# Patient Record
Sex: FEMALE | Race: BLACK OR AFRICAN AMERICAN | NOT HISPANIC OR LATINO | Employment: UNEMPLOYED | URBAN - METROPOLITAN AREA
[De-identification: names, ages, dates, MRNs, and addresses within clinical notes are randomized per-mention and may not be internally consistent; named-entity substitution may affect disease eponyms.]

---

## 2018-07-02 ENCOUNTER — TELEPHONE (OUTPATIENT)
Dept: DERMATOLOGY | Facility: CLINIC | Age: 28
End: 2018-07-02

## 2018-07-02 NOTE — TELEPHONE ENCOUNTER
----- Message from Mona Clayton sent at 7/2/2018  8:15 AM CDT -----  Contact: pt sister   Call caller regarding pt appt that was cancel.  [phone]

## 2018-07-05 ENCOUNTER — TELEPHONE (OUTPATIENT)
Dept: DERMATOLOGY | Facility: CLINIC | Age: 28
End: 2018-07-05

## 2018-07-25 ENCOUNTER — OFFICE VISIT (OUTPATIENT)
Dept: NEUROLOGY | Facility: CLINIC | Age: 28
End: 2018-07-25
Payer: MEDICAID

## 2018-07-25 VITALS — HEIGHT: 64 IN | DIASTOLIC BLOOD PRESSURE: 74 MMHG | HEART RATE: 100 BPM | SYSTOLIC BLOOD PRESSURE: 127 MMHG

## 2018-07-25 DIAGNOSIS — F31.9 BIPOLAR AFFECTIVE DISORDER, REMISSION STATUS UNSPECIFIED: Chronic | ICD-10-CM

## 2018-07-25 DIAGNOSIS — G44.209 TENSION HEADACHE: ICD-10-CM

## 2018-07-25 DIAGNOSIS — R63.5 WEIGHT GAIN: ICD-10-CM

## 2018-07-25 DIAGNOSIS — G80.0 SPASTIC QUADRIPLEGIC CEREBRAL PALSY: Chronic | ICD-10-CM

## 2018-07-25 PROCEDURE — 99214 OFFICE O/P EST MOD 30 MIN: CPT | Mod: PBBFAC | Performed by: STUDENT IN AN ORGANIZED HEALTH CARE EDUCATION/TRAINING PROGRAM

## 2018-07-25 PROCEDURE — 99999 PR PBB SHADOW E&M-EST. PATIENT-LVL IV: CPT | Mod: PBBFAC,,, | Performed by: STUDENT IN AN ORGANIZED HEALTH CARE EDUCATION/TRAINING PROGRAM

## 2018-07-25 PROCEDURE — 99204 OFFICE O/P NEW MOD 45 MIN: CPT | Mod: S$PBB,,, | Performed by: STUDENT IN AN ORGANIZED HEALTH CARE EDUCATION/TRAINING PROGRAM

## 2018-07-25 RX ORDER — METOPROLOL SUCCINATE 25 MG/1
25 TABLET, EXTENDED RELEASE ORAL DAILY
COMMUNITY
End: 2018-10-22

## 2018-07-25 RX ORDER — NAPROXEN 500 MG/1
500 TABLET ORAL 2 TIMES DAILY
COMMUNITY
End: 2018-12-11

## 2018-07-25 RX ORDER — SERTRALINE HYDROCHLORIDE 100 MG/1
100 TABLET, FILM COATED ORAL DAILY
COMMUNITY
End: 2018-10-22

## 2018-07-25 RX ORDER — TRAMADOL HYDROCHLORIDE 50 MG/1
50 TABLET ORAL EVERY 6 HOURS PRN
COMMUNITY
End: 2018-10-22

## 2018-07-25 RX ORDER — PANTOPRAZOLE SODIUM 40 MG/1
40 TABLET, DELAYED RELEASE ORAL DAILY
COMMUNITY

## 2018-07-25 RX ORDER — BUTALBITAL, ACETAMINOPHEN AND CAFFEINE 50; 325; 40 MG/1; MG/1; MG/1
1 TABLET ORAL EVERY 4 HOURS PRN
COMMUNITY
End: 2018-12-11

## 2018-07-25 RX ORDER — LANOLIN ALCOHOL/MO/W.PET/CERES
400 CREAM (GRAM) TOPICAL 2 TIMES DAILY
Refills: 0 | COMMUNITY
Start: 2018-07-25 | End: 2018-12-11

## 2018-07-25 NOTE — ASSESSMENT & PLAN NOTE
Patient follows with Dr. Elsie Flores at [facility] [phone].    Depakote as above

## 2018-07-25 NOTE — ASSESSMENT & PLAN NOTE
Resolved.    From neurology perspective, ok to DC depakote, as there are multiple alternative medications which would be safer in the long-term, particularly in the context of a female of child-bearing age.  Furthermore, patient reports that she has been migraine free for nearly a decade, and could certainly trial off migraine medications.  However, the patient was recently diagnosed with bipolar disorder.      Regarding pregnancy:  - patient is currently on depo-provera  - patient is not currently  interested in pregnancy.  Patient, patient's mother, and patient's care worker urged to:   (a) discuss alternative medication with psychiatry regarding her bipolar d/o, if depakote is being used to help manage this   (b) seek medical advice for recommending alternative medications should she either stop using depo, or start considering pregnancy.

## 2018-07-25 NOTE — PATIENT INSTRUCTIONS
1- Start magnesium oxide 400mg twice a day  2- Discuss the possibility of stopping depakote (and finding an alternative for bipolar disorder) with psychiatry.  From a neurology perspective, there multiple alternative medications that we can discuss for headache prevention.     Please discuss changing medications if pregnancy is considered/desired.  3- Conservative headache prevention techniques:   Limit all rescue medications to no more than 2x/week (max 3x/week).   Stay well-hydrated.  Eat regularly.  Avoid stress when possible, and try your best to get enough sleep.     Limit caffeine intake, particularly in the afternoon/evenings.  4- Come back in 3 months to see me  5- Medical records release

## 2018-07-25 NOTE — ASSESSMENT & PLAN NOTE
Ok to DC depakote from neurology perspective.    Conservative measures discussed with patient, patient's mother, and patient's care giver, including:  - Limit all rescue medications to no more than 2x/week (max 3x/week).  - Stay well-hydrated.  Eat regularly.  Avoid stress when possible, and try your best to get enough sleep.    - Limit caffeine intake, particularly in the afternoon/evenings.  - Starting magnesium oxide 400mg bid    Medical records release form signed and faxed.  Patient's mother was asked to bring cerebral imaging discs to next visit.      RTC 3 mo.  If insufficient relief with above, will consider alternative headache-prevention medications.

## 2018-07-25 NOTE — PROGRESS NOTES
Name: Sandro Ojeda  MRN: [id number]   [facility]: [id number]      Date: 07/25/2018    Chief Complaint:  headache    History of Present Illness (HPI) 07/25/2018:  Ms. Ojeda is a 29 yo F with cerebral palsy, bipolar d/o, and migraines who presents to establish care for her HA. She presents in a wheelchair with her mother and a (very knowledgeable) worker from the  program where Ms. Ojeda goes during the day; she lives on her own in an apartment in [place] with 24h assistance.  Her current HA type started in April or May.  It is worst in the R neck, and travels up along her head towards the front.  No photophobia/phonophobia/vision changes, but occasional nausea.  She has been prescribed fioricet, which she has taken every other day during occasional bad weeks, but currently hasn't taken in six days.  The fioricet helps somewhat, but not substantially.  She has a h/o migraine (in her teens), with associated photophobia/phonophobia/nausea, which was treated with Depakote 375mg daily, which she is still taking.  She has not had a typical migraine in about ten years.      ROS:  Positive for HA, L eye blindness, occasional nausea, urinary retention (urology appt next week). Negative for chest pain, palpitations, vomiting, abdo pain, diarrhea.    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    Social History: The patient  reports that she has never smoked. She does not have any smokeless tobacco history on file. She reports that she does not drink alcohol or use drugs.    Family History: Their family history includes Heart disease in her maternal grandmother and maternal uncle; Migraines in her sister.    Allergies: Topamax [topiramate]      Meds:   Current Outpatient Prescriptions on File Prior to Visit   Medication Sig Dispense Refill    baclofen (LIORESAL) 10 MG tablet  "Take 1 tablet (10 mg total) by mouth 2 (two) times daily. 60 tablet 6    cetirizine (ZYRTEC) 10 MG tablet Take 10 mg by mouth once daily.      divalproex (DEPAKOTE SPRINKLE) 125 mg capsule Take 3 CapsulesQhs 90 capsule 6    quetiapine (SEROQUEL) 200 MG Tab Take 300 mg by mouth.       tamsulosin (FLOMAX) 0.4 mg Cp24 Take 0.4 mg by mouth once daily.      CARAFATE 100 mg/mL suspension   2    citalopram (CELEXA) 10 mg/5 mL suspension Take by mouth once daily. 4TSP      dicyclomine (BENTYL) 10 MG capsule Take 10 mg by mouth 4 (four) times daily before meals and nightly.      fluticasone (FLONASE) 50 mcg/actuation nasal spray 2 sprays by Each Nare route once daily.      lansoprazole (PREVACID) 30 MG capsule Take 30 mg by mouth once daily.      oxybutynin (DITROPAN) 5 MG Tab 2 (two) times daily.   1    sucralfate (CARAFATE) 1 gram tablet Take 1 g by mouth 4 (four) times daily.      tolterodine (DETROL) 2 MG Tab Take 1 mg by mouth once daily.       No current facility-administered medications on file prior to visit.        Exam:  /74   Pulse 100   Ht 5' 4" (1.626 m)     Constitutional  Well-developed, well-nourished, appears stated age.  Sitting in wheelchair.   Respiratory  Normal respiratory effort   Cardiovascular  Radial pulses 2+ and symmetric.  No LE edema bilaterally   Neurological    * Mental status      - Orientation  Oriented to person, place, time, and situation     - Memory   Adequate recent and remote     - Attention/concentration  Intact     - Language   Stutters, repeats, but fluent.  Speech is thick.  (Frenulum intact along length of tongue.)     - Fund of knowledge  Aware of current events     - Executive  Well-organized thoughts     - Other     * Cranial nerves       - CN II  Visual fields full to confrontation with R eye     - CN III, IV, VI  Asymmetry with slight exodeviation of L eye     - CN V  Sensation V1 - V3 intact     - CN VII  Face strong and symmetric bilaterally     - CN " VIII  Hearing intact bilaterally     - CN IX, X  Palate raises midline and symmetric     - CN XI  SCM 4+/5 bilaterally.  Trapezius 2/5 bilaterally     - CN XII  Unable to protrude midline   * Motor  Muscle bulk decreased.  Strength 5/5 with bilateral elbow flexion/extension and  strength.  Increased tone throughout BUE, higher on L than R.  Unable to extend L wrist to midline, or to get L fingers fully extended, even with pROM.  Increased tone of bilateral hips and knees, with mild hypotonia of bilateral ankles.  Bilateral knee flexion 4-/5 and knee extension 4/5.  Dorsiflexion 4/5 bilat.  Plantar flexion 4-/5.    * Sensory   Symmetrical to light touch bilaterally throughout.      * Coordination  Unable to perform finger to nose or heel to shin   * Gait  Non-ambulatory   * Deep tendon reflexes  3+ biceps and bilateral brachioradialis    2+ and symmetric bilateral knees     Laboratory/Radiological:  - Results:  No visits with results within 1 Month(s) from this visit.   Latest known visit with results is:   Lab Visit on 10/01/2014   Component Date Value Ref Range Status    Valproic Acid Lvl 10/01/2014 22.2* 50.0 - 100.0 ug/mL Final    Sodium 10/01/2014 140  136 - 145 mmol/L Final    Potassium 10/01/2014 3.9  3.5 - 5.1 mmol/L Final    Chloride 10/01/2014 106  95 - 110 mmol/L Final    CO2 10/01/2014 21* 23 - 29 mmol/L Final    Glucose 10/01/2014 89  70 - 110 mg/dL Final    BUN, Bld 10/01/2014 11  6 - 20 mg/dL Final    Creatinine 10/01/2014 0.7  0.5 - 1.4 mg/dL Final    Calcium 10/01/2014 9.9  8.7 - 10.5 mg/dL Final    Total Protein 10/01/2014 7.6  6.0 - 8.4 g/dL Final    Albumin 10/01/2014 3.7  3.5 - 5.2 g/dL Final    Total Bilirubin 10/01/2014 0.4  0.1 - 1.0 mg/dL Final    Alkaline Phosphatase 10/01/2014 63  55 - 135 U/L Final    AST 10/01/2014 18  10 - 40 U/L Final    ALT 10/01/2014 22  10 - 44 U/L Final    Anion Gap 10/01/2014 13  8 - 16 mmol/L Final    eGFR if African American 10/01/2014 >60.0   >60 mL/min/1.73 m^2 Final    eGFR if non African American 10/01/2014 >60.0  >60 mL/min/1.73 m^2 Final    WBC 10/01/2014 6.96  3.90 - 12.70 K/uL Final    RBC 10/01/2014 4.22  4.00 - 5.40 M/uL Final    Hemoglobin 10/01/2014 13.8  12.0 - 16.0 g/dL Final    Hematocrit 10/01/2014 40.0  37.0 - 48.5 % Final    MCV 10/01/2014 95  82 - 98 fL Final    MCH 10/01/2014 32.7* 27.0 - 31.0 pg Final    MCHC 10/01/2014 34.5  32.0 - 36.0 % Final    RDW 10/01/2014 13.1  11.5 - 14.5 % Final    Platelets 10/01/2014 193  150 - 350 K/uL Final    MPV 10/01/2014 11.8  9.2 - 12.9 fL Final    Gran # (ANC) 10/01/2014 3.7  1.8 - 7.7 K/uL Final    Lymph # 10/01/2014 2.3  1.0 - 4.8 K/uL Final    Mono # 10/01/2014 0.8  0.3 - 1.0 K/uL Final    Eos # 10/01/2014 0.1  0.0 - 0.5 K/uL Final    Baso # 10/01/2014 0.01  0.00 - 0.20 K/uL Final    Gran% 10/01/2014 53.7  38.0 - 73.0 % Final    Lymph% 10/01/2014 33.2  18.0 - 48.0 % Final    Mono% 10/01/2014 11.4  4.0 - 15.0 % Final    Eosinophil% 10/01/2014 1.3  0.0 - 8.0 % Final    Basophil% 10/01/2014 0.1  0.0 - 1.9 % Final    Differential Method 10/01/2014 Automated   Final     No imaging available for review.    Problem List Items Addressed This Visit        Neuro    Spastic quadriplegic cerebral palsy (Chronic)    Current Assessment & Plan     Per PMR         Tension headache    Overview     Followed by Dr. Li in neurology clinic         Current Assessment & Plan     Ok to DC depakote from neurology perspective.    Conservative measures discussed with patient, patient's mother, and patient's care giver, including:  - Limit all rescue medications to no more than 2x/week (max 3x/week).  - Stay well-hydrated.  Eat regularly.  Avoid stress when possible, and try your best to get enough sleep.    - Limit caffeine intake, particularly in the afternoon/evenings.  - Starting magnesium oxide 400mg bid    Medical records release form signed and faxed.  Patient's mother was asked to bring  cerebral imaging discs to next visit.      RTC 3 mo.  If insufficient relief with above, will consider alternative headache-prevention medications.         Chronic migraine    Current Assessment & Plan     Resolved.    From neurology perspective, ok to DC depakote, as there are multiple alternative medications which would be safer in the long-term, particularly in the context of a female of child-bearing age.  Furthermore, patient reports that she has been migraine free for nearly a decade, and could certainly trial off migraine medications.  However, the patient was recently diagnosed with bipolar disorder.      Regarding pregnancy:  - patient is currently on depo-provera  - patient is not currently  interested in pregnancy.  Patient, patient's mother, and patient's care worker urged to:   (a) discuss alternative medication with psychiatry regarding her bipolar d/o, if depakote is being used to help manage this   (b) seek medical advice for recommending alternative medications should she either stop using depo, or start considering pregnancy.              Psychiatric    Bipolar disorder (Chronic)    Current Assessment & Plan     Patient follows with Dr. Elsie Flores at [facility] [phone].    Depakote as above            Endocrine    Weight gain    Current Assessment & Plan     Likely component of depakote use             RTC 3 mo, or sooner, prn    Dina Li MD  [facility]  PGY-4

## 2018-07-30 ENCOUNTER — NURSE TRIAGE (OUTPATIENT)
Dept: ADMINISTRATIVE | Facility: CLINIC | Age: 28
End: 2018-07-30

## 2018-08-01 ENCOUNTER — TELEPHONE (OUTPATIENT)
Dept: NEUROLOGY | Facility: CLINIC | Age: 28
End: 2018-08-01

## 2018-08-01 NOTE — TELEPHONE ENCOUNTER
----- Message from Shereen Javed sent at 7/31/2018  9:18 AM CDT -----  Contact: Shira (Mother) [phone]  Needs Advice    Reason for call: Shira would like to speak to someone regarding the patient's severe headaches. Please contact the patient's mother to discuss further.      Communication Preference:PHONE   Additional Information:

## 2018-08-01 NOTE — TELEPHONE ENCOUNTER
----- Message from Ophelia Bernal sent at 7/30/2018  3:01 PM CDT -----  Contact: Shira (mother) @ [phone]  Calling to speak with someone in Dr. Li' office regarding medication: magnesium oxide (MAG-OX) 400 mg tablet. Please call.

## 2018-10-22 ENCOUNTER — OFFICE VISIT (OUTPATIENT)
Dept: GASTROENTEROLOGY | Facility: CLINIC | Age: 28
End: 2018-10-22
Payer: MEDICAID

## 2018-10-22 VITALS
WEIGHT: 158 LBS | BODY MASS INDEX: 27.12 KG/M2 | DIASTOLIC BLOOD PRESSURE: 80 MMHG | SYSTOLIC BLOOD PRESSURE: 122 MMHG | HEART RATE: 120 BPM

## 2018-10-22 DIAGNOSIS — K59.04 CHRONIC IDIOPATHIC CONSTIPATION: ICD-10-CM

## 2018-10-22 DIAGNOSIS — G80.0 SPASTIC QUADRIPARESIS SECONDARY TO CEREBRAL PALSY: ICD-10-CM

## 2018-10-22 DIAGNOSIS — K21.9 GASTROESOPHAGEAL REFLUX DISEASE WITHOUT ESOPHAGITIS: ICD-10-CM

## 2018-10-22 DIAGNOSIS — I69.321 DYSPHASIA S/P CVA (CEREBROVASCULAR ACCIDENT): ICD-10-CM

## 2018-10-22 DIAGNOSIS — R13.14 PHARYNGOESOPHAGEAL DYSPHAGIA: Primary | ICD-10-CM

## 2018-10-22 PROCEDURE — 99213 OFFICE O/P EST LOW 20 MIN: CPT | Mod: PBBFAC | Performed by: INTERNAL MEDICINE

## 2018-10-22 PROCEDURE — 99999 PR PBB SHADOW E&M-EST. PATIENT-LVL III: CPT | Mod: PBBFAC,,, | Performed by: INTERNAL MEDICINE

## 2018-10-22 PROCEDURE — 99204 OFFICE O/P NEW MOD 45 MIN: CPT | Mod: S$PBB,,, | Performed by: INTERNAL MEDICINE

## 2018-10-22 RX ORDER — MOMETASONE FUROATE 50 UG/1
2 SPRAY, METERED NASAL
COMMUNITY

## 2018-10-22 RX ORDER — ACETAMINOPHEN 160 MG
10 TABLET,CHEWABLE ORAL DAILY PRN
COMMUNITY
End: 2018-12-11

## 2018-10-22 RX ORDER — METOPROLOL TARTRATE 25 MG/1
25 TABLET, FILM COATED ORAL
COMMUNITY

## 2018-10-22 RX ORDER — FLUTICASONE PROPIONATE AND SALMETEROL 100; 50 UG/1; UG/1
1 POWDER RESPIRATORY (INHALATION) EVERY 4 HOURS PRN
COMMUNITY
End: 2018-12-11

## 2018-10-22 NOTE — PROGRESS NOTES
Subjective:       Patient ID: Sandro Ojeda is a 28 y.o. female.    Chief Complaint: Gastroesophageal Reflux; Establish Care; and Dysphagia    We are asked to see this patient to establish care. She was previously seen by Dr Malloy but she no longer takes the patient's insurance. The patient was followed for GERD, but now is controlled with Protonix. She also has problems with dysphagia including an acute episode leading to an ED visit ~ 1 week ago. She has a history of previous CVA and has spastic quadriplegia. They found no abnormalities. She has not had a speech therapy evaluation since she was a child.     Review of various x rays has shown the patient to have constipation. She says she has BMs regularly now.      Review of Systems   Constitutional: Negative for activity change, appetite change, chills, diaphoresis, fatigue, fever and unexpected weight change.   HENT: Negative for congestion, ear discharge, ear pain, hearing loss, nosebleeds, postnasal drip and tinnitus.    Eyes: Positive for visual disturbance. Negative for photophobia.   Respiratory: Negative for apnea, cough, choking, chest tightness, shortness of breath and wheezing.    Cardiovascular: Negative for chest pain, palpitations and leg swelling.   Gastrointestinal: Positive for constipation. Negative for abdominal distention, abdominal pain, anal bleeding, blood in stool, diarrhea, nausea, rectal pain and vomiting.   Genitourinary: Negative for difficulty urinating, dyspareunia, dysuria, flank pain, frequency, hematuria, menstrual problem, pelvic pain, urgency, vaginal bleeding and vaginal discharge.   Musculoskeletal: Positive for gait problem. Negative for arthralgias, back pain, joint swelling, myalgias and neck stiffness.        Wheelchair bound   Skin: Negative for pallor and rash.   Neurological: Positive for speech difficulty and headaches. Negative for dizziness, tremors, seizures, syncope, weakness and numbness.        Spasticity of  multiple joints   Hematological: Negative for adenopathy.   Psychiatric/Behavioral: Negative for agitation, confusion, hallucinations, sleep disturbance and suicidal ideas.       Objective:      Physical Exam   Constitutional: She is oriented to person, place, and time. She appears well-developed and well-nourished.   HENT:   Head: Normocephalic and atraumatic.   Bilateral turbinate congestion   Eyes: Conjunctivae and EOM are normal. Pupils are equal, round, and reactive to light. Right eye exhibits no discharge. Left eye exhibits no discharge. No scleral icterus.   Neck: Normal range of motion. Neck supple. No JVD present. No thyromegaly present.   Cardiovascular: Regular rhythm, normal heart sounds and intact distal pulses. Exam reveals no gallop and no friction rub.   No murmur heard.  Tachycardia   Pulmonary/Chest: Effort normal and breath sounds normal. No respiratory distress. She has no wheezes. She has no rales. She exhibits no tenderness.   Abdominal: Soft. Bowel sounds are normal. She exhibits no distension and no mass. There is no tenderness. There is no rebound and no guarding.   Musculoskeletal: Normal range of motion. She exhibits no edema.   Lymphadenopathy:     She has no cervical adenopathy.   Neurological: She is alert and oriented to person, place, and time. She has normal reflexes. She exhibits abnormal muscle tone. Coordination normal.   Joint Spasticity; left side greater than right   Skin: Skin is warm and dry. No rash noted. No erythema. No pallor.   Psychiatric: She has a normal mood and affect. Her behavior is normal. Judgment and thought content normal.   Vitals reviewed.      Assessment:    GERD   Dysphagia   Constipation   S/P CVA         Spastic quadriplegia  No diagnosis found.    Plan:   Esophagram  Modified Barium Swallow

## 2018-10-29 ENCOUNTER — HOSPITAL ENCOUNTER (OUTPATIENT)
Dept: RADIOLOGY | Facility: HOSPITAL | Age: 28
Discharge: HOME | End: 2018-10-29
Attending: INTERNAL MEDICINE
Payer: MEDICAID

## 2018-10-29 DIAGNOSIS — K21.9 GASTROESOPHAGEAL REFLUX DISEASE WITHOUT ESOPHAGITIS: ICD-10-CM

## 2018-10-29 DIAGNOSIS — R13.14 PHARYNGOESOPHAGEAL DYSPHAGIA: ICD-10-CM

## 2018-10-29 PROCEDURE — G8996 SWALLOW CURRENT STATUS: HCPCS | Mod: CI

## 2018-10-29 PROCEDURE — G8998 SWALLOW D/C STATUS: HCPCS | Mod: CI

## 2018-10-29 PROCEDURE — 25500020 PHARM REV CODE 255: Performed by: INTERNAL MEDICINE

## 2018-10-29 PROCEDURE — G8997 SWALLOW GOAL STATUS: HCPCS | Mod: CI

## 2018-10-29 PROCEDURE — 92611 MOTION FLUOROSCOPY/SWALLOW: CPT

## 2018-10-29 PROCEDURE — 74230 X-RAY XM SWLNG FUNCJ C+: CPT | Mod: TC

## 2018-10-29 PROCEDURE — A9698 NON-RAD CONTRAST MATERIALNOC: HCPCS | Performed by: INTERNAL MEDICINE

## 2018-10-29 PROCEDURE — 74220 X-RAY XM ESOPHAGUS 1CNTRST: CPT | Mod: TC

## 2018-10-29 RX ADMIN — Medication 25 ML: at 02:10

## 2018-10-29 NOTE — PROCEDURES
Modified Barium Swallow    Patient Name:  Sandro Ojeda   MRN:  [id number]      Recommendations:     Recommendations:                General Recommendations:  Follow-up not indicated  Diet recommendations: Dental soft diet with thin liquids   Aspiration Precautions: 1 bite/sip at a time, HOB to 90 degrees, Remain upright 30 minutes post meal and Small bites/sips   General Precautions: Standard,      Referral     Reason for Referral  Patient was referred for a Modified Barium Swallow Study to assess the efficiency of his/her swallow function, rule out aspiration and make recommendations regarding safe dietary consistencies, effective compensatory strategies, and safe eating environment.     Diagnosis:    Diagnoses of Pharyngoesophageal dysphagia and Gastroesophageal reflux disease without esophagitis were pertinent to this visit.    Pt c/o odynophagia.  Pt's mom reported pt taking a a long time to swallow.      History:     Past Medical History:   Diagnosis Date    Bipolar disorder 7/25/2018    GERD (gastroesophageal reflux disease)     Headache(784.0)     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    Tension headache 7/25/2018       Objective:     Current Respiratory Status:  No O2 support required    Alert: yes    Cooperative: yes    Follows Directions: yes    Visualization  · Patient was seen in the lateral view    Oral Peripheral Examination  ·  Dentition present and adequate     Consistencies Assessed  · Thin liquids, puree, and solids with barium    Oral Preparation/Oral Phase  · Pt with oral holding and a delayed onset of swallow with no premature spillage    Pharyngeal Phase   · WFL - Pt with adequate laryngeal elevation, bolus propulsion, bolus clearance, and airway protection across consistencies    Cervical Esophageal Phase  · UES appeared to accommodate all bolus types without stasis     Assessment:     Impressions  ·  Pt presents with a safe, functional swallow across consistencies.  No aspiration  or penetration was visualized.  Pt does exhibit oral holding and complained of a painful swallow.      Education  Results were discussed with patient and her mother.    Time Tracking:   SLP Treatment Date:    10/29/2018  Speech Start Time:   13:45  Speech Stop Time:      14:30  Speech Total Time (min):   45 minutes    Maddy Lepe CCC-SLP  10/29/2018

## 2018-11-06 ENCOUNTER — TELEPHONE (OUTPATIENT)
Dept: GASTROENTEROLOGY | Facility: CLINIC | Age: 28
End: 2018-11-06

## 2018-11-06 NOTE — TELEPHONE ENCOUNTER
----- Message from Yusef Stone III, MD sent at 11/5/2018  5:43 PM CST -----  Contact: self  Shows normal esophageal caliber and normal motility. 1 episode of GERD. GH  ----- Message -----  From: Joe Nelson MA  Sent: 11/5/2018   4:44 PM  To: Yusef Stone III, MD    Patient is requesting esophagram results  ----- Message -----  From: Sharon Robbins  Sent: 11/5/2018   4:40 PM  To: [facility] Staff    Pt calling in regards to test results. Please call pt back at [phone].      Thanks,   Sharon Robbins

## 2018-12-11 ENCOUNTER — OFFICE VISIT (OUTPATIENT)
Dept: NEUROLOGY | Facility: CLINIC | Age: 28
End: 2018-12-11
Payer: MEDICAID

## 2018-12-11 VITALS
BODY MASS INDEX: 23.9 KG/M2 | SYSTOLIC BLOOD PRESSURE: 99 MMHG | HEART RATE: 77 BPM | DIASTOLIC BLOOD PRESSURE: 60 MMHG | HEIGHT: 64 IN | WEIGHT: 140 LBS

## 2018-12-11 DIAGNOSIS — F31.75 BIPOLAR DISORDER, IN PARTIAL REMISSION, MOST RECENT EPISODE DEPRESSED: Chronic | ICD-10-CM

## 2018-12-11 DIAGNOSIS — G44.209 TENSION HEADACHE: ICD-10-CM

## 2018-12-11 DIAGNOSIS — R20.8 VIBRATION SENSORY LOSS: Primary | ICD-10-CM

## 2018-12-11 PROCEDURE — 99999 PR PBB SHADOW E&M-EST. PATIENT-LVL IV: CPT | Mod: PBBFAC,,, | Performed by: STUDENT IN AN ORGANIZED HEALTH CARE EDUCATION/TRAINING PROGRAM

## 2018-12-11 PROCEDURE — 99214 OFFICE O/P EST MOD 30 MIN: CPT | Mod: S$PBB,,, | Performed by: STUDENT IN AN ORGANIZED HEALTH CARE EDUCATION/TRAINING PROGRAM

## 2018-12-11 PROCEDURE — 99214 OFFICE O/P EST MOD 30 MIN: CPT | Mod: PBBFAC | Performed by: STUDENT IN AN ORGANIZED HEALTH CARE EDUCATION/TRAINING PROGRAM

## 2018-12-11 RX ORDER — BUTALBITAL, ACETAMINOPHEN AND CAFFEINE 50; 325; 40 MG/1; MG/1; MG/1
1 TABLET ORAL EVERY 4 HOURS PRN
Qty: 9 TABLET | Refills: 5 | Status: SHIPPED | OUTPATIENT
Start: 2018-12-11 | End: 2019-01-10

## 2018-12-11 RX ORDER — LANOLIN ALCOHOL/MO/W.PET/CERES
400 CREAM (GRAM) TOPICAL 2 TIMES DAILY
Refills: 0 | COMMUNITY
Start: 2018-12-11

## 2018-12-11 RX ORDER — AMITRIPTYLINE HYDROCHLORIDE 50 MG/1
TABLET, FILM COATED ORAL
Qty: 30 TABLET | Refills: 11 | Status: SHIPPED | OUTPATIENT
Start: 2018-12-11 | End: 2019-10-25 | Stop reason: SDUPTHER

## 2018-12-11 NOTE — PROGRESS NOTES
Name: Sandro Ojeda  MRN: [id number]   CSN: [id number]      Date: 12/11/2018    Chief Complaint:  Headache    Interval History 12/11/18: Mild/infrequent HAs for several months after last seen in clinic, but now more severe and frequent HA (approx 5x/week, lasting hours-day) for approx one month.  Depakote DCd perhaps three months ago.  Seroquel DCd in favor of Abilify (psychiatry).  HA described this time as pounding, bilateral posterior, and associated with nausea and photophobia, with intermittent, concomitant more mild HA.  She took magnesium oxide one time only.  She has not taken any Fioricet since last visit.  She is not taking any medications for abortive therapy at this time.    History of Present Illness (HPI) 07/25/2018:  Ms. Ojeda is a 27 yo F with cerebral palsy, bipolar d/o, and migraines who presents to establish care for her HA. She presents in a wheelchair with her mother and a (very knowledgeable) worker from the  program where Ms. Ojeda goes during the day; she lives on her own in an apartment in [place] with 24h assistance.  Her current HA type started in April or May.  It is worst in the R neck, and travels up along her head towards the front.  No photophobia/phonophobia/vision changes, but occasional nausea.  She has been prescribed fioricet, which she has taken every other day during occasional bad weeks, but currently hasn't taken in six days.  The fioricet helps somewhat, but not substantially.  She has a h/o migraine (in her teens), with associated photophobia/phonophobia/nausea, which was treated with Depakote 375mg daily, which she is still taking.  She has not had a typical migraine in about ten years.      ROS:  Positive for HA, L eye blindness, occasional nausea. Negative for chest pain, palpitations, vomiting, abdo pain, diarrhea, constipation.    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    Social History: The patient  reports that  has never smoked. She does not have any smokeless tobacco history on file. She reports that she does not drink alcohol or use drugs.    Family History: Their family history includes Heart disease in her maternal grandmother and maternal uncle; Migraines in her sister.    Allergies: Monosodium glutamate; Topamax [topiramate]; and Vancomycin      Meds:   Current Outpatient Medications on File Prior to Visit   Medication Sig Dispense Refill    baclofen (LIORESAL) 10 MG tablet Take 1 tablet (10 mg total) by mouth 2 (two) times daily. 60 tablet 6    cetirizine (ZYRTEC) 10 MG tablet Take 10 mg by mouth once daily.      metoprolol tartrate (LOPRESSOR) 25 MG tablet Take 25 mg by mouth.      mometasone (NASONEX) 50 mcg/actuation nasal spray 2 sprays by Nasal route.      pantoprazole (PROTONIX) 40 MG tablet Take 40 mg by mouth once daily.      tamsulosin (FLOMAX) 0.4 mg Cp24 Take 0.4 mg by mouth once daily.      [DISCONTINUED] butalbital-acetaminophen-caffeine -40 mg (FIORICET, ESGIC) -40 mg per tablet Take 1 tablet by mouth every 4 (four) hours as needed for Pain.      [DISCONTINUED] citalopram (CELEXA) 10 mg/5 mL suspension Take by mouth once daily. 4TSP      [DISCONTINUED] dicyclomine (BENTYL) 10 MG capsule Take 10 mg by mouth 4 (four) times daily before meals and nightly.      [DISCONTINUED] divalproex (DEPAKOTE SPRINKLE) 125 mg capsule Take 3 CapsulesQhs 90 capsule 6    [DISCONTINUED] fluticasone-salmeterol 100-50 mcg/dose (ADVAIR) 100-50 mcg/dose diskus inhaler Inhale 1 puff into the lungs every 4 (four) hours as needed.      [DISCONTINUED] lansoprazole (PREVACID) 30 MG capsule Take 30 mg by mouth once daily.      [DISCONTINUED] loratadine (CLARITIN) 5 mg/5 mL syrup Take 10 mg by mouth daily as needed.      [DISCONTINUED] magnesium oxide  "(MAG-OX) 400 mg tablet Take 1 tablet (400 mg total) by mouth 2 (two) times daily.  0    [DISCONTINUED] naproxen (NAPROSYN) 500 MG tablet Take 500 mg by mouth 2 (two) times daily.      [DISCONTINUED] oxybutynin (DITROPAN) 5 MG Tab 2 (two) times daily.   1    [DISCONTINUED] quetiapine (SEROQUEL) 200 MG Tab Take 400 mg by mouth.       [DISCONTINUED] tolterodine (DETROL) 2 MG Tab Take 1 mg by mouth once daily.       No current facility-administered medications on file prior to visit.        Exam:  BP 99/60   Pulse 77   Ht 5' 4" (1.626 m)   Wt 63.5 kg (140 lb)   BMI 24.03 kg/m²     Constitutional  Well-developed, well-nourished, appears stated age.  Sitting in wheelchair.   Respiratory  Normal respiratory effort   Cardiovascular  Radial pulses 2+ and symmetric.  No LE edema bilaterally   Neurological    * Mental status      - Orientation  Oriented to person, place, time, and situation     - Memory   Adequate recent and remote     - Attention/concentration  Intact     - Language   Stutters, repeats, but fluent.  Speech is thick.  (Frenulum intact along length of tongue.)     - Fund of knowledge  Aware of current events     - Executive  Well-organized thoughts     - Other     * Cranial nerves       - CN II  Visual fields full to confrontation with R eye     - CN III, IV, VI  R CNVI palsy     - CN V  Sensation V1 - V3 intact     - CN VII  Face strong and symmetric bilaterally     - CN VIII  Hearing intact bilaterally     - CN IX, X  Palate raises midline and symmetric     - CN XI  SCM 4+/5 bilaterally.  Trapezius 2/5 bilaterally     - CN XII  Unable to protrude midline   * Motor  Muscle bulk decreased.  Strength 5/5 with bilateral elbow flexion/extension and  strength (with encouragement).  Unable to extend L wrist to midline, or to get L fingers fully extended, even with pROM.  Increased tone of bilateral hips and knees, with mild hypotonia of bilateral ankles.  Bilateral knee flexion 4-/5 and knee extension " 4/5.  Dorsiflexion 4/5 bilat.  Plantar flexion 4-/5.    * Sensory   Symmetrical to light touch bilaterally throughout.      * Coordination  Unable to perform finger to nose or heel to shin   * Gait  Non-ambulatory   * Deep tendon reflexes  3+ biceps and bilateral brachioradialis    2+ and symmetric bilateral knees     Laboratory/Radiological:  - Results:  No visits with results within 1 Month(s) from this visit.   Latest known visit with results is:   Lab Visit on 10/01/2014   Component Date Value Ref Range Status    Valproic Acid Lvl 10/01/2014 22.2* 50.0 - 100.0 ug/mL Final    Sodium 10/01/2014 140  136 - 145 mmol/L Final    Potassium 10/01/2014 3.9  3.5 - 5.1 mmol/L Final    Chloride 10/01/2014 106  95 - 110 mmol/L Final    CO2 10/01/2014 21* 23 - 29 mmol/L Final    Glucose 10/01/2014 89  70 - 110 mg/dL Final    BUN, Bld 10/01/2014 11  6 - 20 mg/dL Final    Creatinine 10/01/2014 0.7  0.5 - 1.4 mg/dL Final    Calcium 10/01/2014 9.9  8.7 - 10.5 mg/dL Final    Total Protein 10/01/2014 7.6  6.0 - 8.4 g/dL Final    Albumin 10/01/2014 3.7  3.5 - 5.2 g/dL Final    Total Bilirubin 10/01/2014 0.4  0.1 - 1.0 mg/dL Final    Alkaline Phosphatase 10/01/2014 63  55 - 135 U/L Final    AST 10/01/2014 18  10 - 40 U/L Final    ALT 10/01/2014 22  10 - 44 U/L Final    Anion Gap 10/01/2014 13  8 - 16 mmol/L Final    eGFR if African American 10/01/2014 >60.0  >60 mL/min/1.73 m^2 Final    eGFR if non African American 10/01/2014 >60.0  >60 mL/min/1.73 m^2 Final    WBC 10/01/2014 6.96  3.90 - 12.70 K/uL Final    RBC 10/01/2014 4.22  4.00 - 5.40 M/uL Final    Hemoglobin 10/01/2014 13.8  12.0 - 16.0 g/dL Final    Hematocrit 10/01/2014 40.0  37.0 - 48.5 % Final    MCV 10/01/2014 95  82 - 98 fL Final    MCH 10/01/2014 32.7* 27.0 - 31.0 pg Final    MCHC 10/01/2014 34.5  32.0 - 36.0 % Final    RDW 10/01/2014 13.1  11.5 - 14.5 % Final    Platelets 10/01/2014 193  150 - 350 K/uL Final    MPV 10/01/2014 11.8  9.2 -  "12.9 fL Final    Gran # (ANC) 10/01/2014 3.7  1.8 - 7.7 K/uL Final    Lymph # 10/01/2014 2.3  1.0 - 4.8 K/uL Final    Mono # 10/01/2014 0.8  0.3 - 1.0 K/uL Final    Eos # 10/01/2014 0.1  0.0 - 0.5 K/uL Final    Baso # 10/01/2014 0.01  0.00 - 0.20 K/uL Final    Gran% 10/01/2014 53.7  38.0 - 73.0 % Final    Lymph% 10/01/2014 33.2  18.0 - 48.0 % Final    Mono% 10/01/2014 11.4  4.0 - 15.0 % Final    Eosinophil% 10/01/2014 1.3  0.0 - 8.0 % Final    Basophil% 10/01/2014 0.1  0.0 - 1.9 % Final    Differential Method 10/01/2014 Automated   Final     Per report, h/o prior AIS seen on cerebral imaging.    Problem List Items Addressed This Visit        Neuro    Tension headache    Overview     Followed by Dr. Li in neurology clinic         Current Assessment & Plan     Limit all rescue medications to no more than 3x/week.  Stay well-hydrated.  Eat regularly.  Avoid stress when possible, and try your best to get enough sleep.    Limit caffeine intake, particularly in the afternoon/evenings.  Magnesium oxide 400mg bid     Ok to take ibuprofen prn for more mild HA - but no more than 2xday or 3x/week         Chronic migraine    Current Assessment & Plan     Greatly increased symptoms since Depakote DCd.   Given potential S/E of Depakote with long-term use, will prescribe alternative medication for migraine prevention.   - No Topamax/Zonegran 2/2 h/o multiple kidney stones   - No propranolol given SBP 99 today (and h/o low/normal BP)   - Consider Lamictal or Elavil for migraine prevention + mood stabilization.    Will start with Elavil 25mg qhs.  If well-tolerated, ok to increase to 50mg qhs after two weeks.  50mg tablets prescribed.  If patient feels very sleepy ("hungover") the next day, recommend taking dose earlier in the evening.    Abortive therapy: given h/o AIS (?  No imaging available to review this visit), avoid triptans.     - Cautious Fioricet use for onset of severe HA: no more than 2x/day or " 3x/week.    Recommend also starting magnesium oxide 400mg bid for concomitant headaches.            Psychiatric    Bipolar disorder (Chronic)    Current Assessment & Plan     Managed by psychiatry    Elavil, Lamictal considered for HA prevention in the setting of concomitant mood disorders.           Other Visit Diagnoses     Vibration sensory loss    -  Primary    Relevant Orders    Vitamin B1    Vitamin B2    Vitamin B6    Vitamin B12    TSH    Hemoglobin A1c    RPR        RTC 3 mo, or sooner, prn in general neurology clinic.    Dina Li MD  [facility]  PGY-4

## 2018-12-11 NOTE — ASSESSMENT & PLAN NOTE
Managed by psychiatry    Elavil, Lamictal considered for HA prevention in the setting of concomitant mood disorders.

## 2018-12-11 NOTE — ASSESSMENT & PLAN NOTE
Limit all rescue medications to no more than 3x/week.  Stay well-hydrated.  Eat regularly.  Avoid stress when possible, and try your best to get enough sleep.    Limit caffeine intake, particularly in the afternoon/evenings.  Magnesium oxide 400mg bid     Ok to take ibuprofen prn for more mild HA - but no more than 2xday or 3x/week

## 2018-12-11 NOTE — PATIENT INSTRUCTIONS
1 - Labs today  2 - Start magnesium oxide 400mg twice a day for headache prevention  3 - Start Elavil tonight.  Start with 25 mg (1/2 tab).  If well-tolerated, ok to increase to 50mg (1 tab) after two weeks.   If you feel very sleepy the next day, I recommend taking the medication earlier in the evening (e.g. 7pm instead of 8pm).  4 - For severe headaches, ok to take Fioricet, but no more than 2/day or 3/week.  5 - For mild headaches, ok to take ibuprofen, so long as total dosage of rescue mediations is no more than 3-4x/week.    Further headache prevention:  Limit all rescue medications to no more than 2x/week (max 3x/week).  Stay well-hydrated.  Eat regularly.  Avoid stress when possible, and try your best to get enough sleep.    Limit caffeine intake, particularly in the afternoon/evenings.

## 2018-12-11 NOTE — ASSESSMENT & PLAN NOTE
"Greatly increased symptoms since Depakote DCd.   Given potential S/E of Depakote with long-term use, will prescribe alternative medication for migraine prevention.   - No Topamax/Zonegran 2/2 h/o multiple kidney stones   - No propranolol given SBP 99 today (and h/o low/normal BP)   - Consider Lamictal or Elavil for migraine prevention + mood stabilization.    Will start with Elavil 25mg qhs.  If well-tolerated, ok to increase to 50mg qhs after two weeks.  50mg tablets prescribed.  If patient feels very sleepy ("hungover") the next day, recommend taking dose earlier in the evening.    Abortive therapy: given h/o AIS (?  No imaging available to review this visit), avoid triptans.     - Cautious Fioricet use for onset of severe HA: no more than 2x/day or 3x/week.    Recommend also starting magnesium oxide 400mg bid for concomitant headaches.  "

## 2019-01-08 ENCOUNTER — TELEPHONE (OUTPATIENT)
Dept: NEUROLOGY | Facility: CLINIC | Age: 29
End: 2019-01-08

## 2019-01-08 NOTE — TELEPHONE ENCOUNTER
Patient called, but no answer, and voicemail full.    Given labs, would recommend OTC vitamin B complex supplementation.  Will call again and/or discuss at next visit.    Dina Li MD  [facility]  PGY-4

## 2019-01-29 ENCOUNTER — TELEPHONE (OUTPATIENT)
Dept: NEUROLOGY | Facility: CLINIC | Age: 29
End: 2019-01-29

## 2019-01-29 NOTE — TELEPHONE ENCOUNTER
----- Message from Danna Montiel sent at 1/29/2019 12:04 PM CST -----  Contact: Pt mom Shira can be reached [phone]  Pt would like to speak with the nurse concerning getting pt blood work.      Thank you!

## 2019-01-29 NOTE — TELEPHONE ENCOUNTER
Spoke to the patient mother and she states you said  if the patients blood work was ok she didn't have to come to the next appointment. She is confirming if this correct.

## 2019-02-05 ENCOUNTER — TELEPHONE (OUTPATIENT)
Dept: NEUROLOGY | Facility: CLINIC | Age: 29
End: 2019-02-05

## 2019-02-05 NOTE — TELEPHONE ENCOUNTER
----- Message from Елена Clayton sent at 2/5/2019  1:20 PM CST -----  Contact: pt's mom  Shira  [phone]  2nd request    Pt's mom called to ask Dr Li for confirmation that her daughter does not have to come back in March since the results from last blood work are ok.    Contact: Pt mom Shira can be reached [phone]

## 2019-09-30 ENCOUNTER — TELEPHONE (OUTPATIENT)
Dept: NEUROLOGY | Facility: CLINIC | Age: 29
End: 2019-09-30

## 2019-09-30 NOTE — TELEPHONE ENCOUNTER
----- Message from Chase Alexander sent at 9/30/2019  2:20 PM CDT -----  Contact: Елена ( sister ) @ [phone]  Caller requesting a return call to establish care with a new physician/resident, marcel call

## 2019-10-25 ENCOUNTER — OFFICE VISIT (OUTPATIENT)
Dept: NEUROLOGY | Facility: CLINIC | Age: 29
End: 2019-10-25
Payer: MEDICAID

## 2019-10-25 ENCOUNTER — LAB VISIT (OUTPATIENT)
Dept: LAB | Facility: HOSPITAL | Age: 29
End: 2019-10-25
Attending: PSYCHIATRY & NEUROLOGY
Payer: MEDICAID

## 2019-10-25 VITALS
BODY MASS INDEX: 24.03 KG/M2 | DIASTOLIC BLOOD PRESSURE: 72 MMHG | HEART RATE: 82 BPM | HEIGHT: 64 IN | RESPIRATION RATE: 16 BRPM | SYSTOLIC BLOOD PRESSURE: 120 MMHG

## 2019-10-25 DIAGNOSIS — I69.359 CVA, OLD, HEMIPARESIS: ICD-10-CM

## 2019-10-25 DIAGNOSIS — G44.209 TENSION HEADACHE: ICD-10-CM

## 2019-10-25 DIAGNOSIS — F31.75 BIPOLAR DISORDER, IN PARTIAL REMISSION, MOST RECENT EPISODE DEPRESSED: Chronic | ICD-10-CM

## 2019-10-25 DIAGNOSIS — M62.838 MUSCLE SPASMS OF BOTH LOWER EXTREMITIES: ICD-10-CM

## 2019-10-25 DIAGNOSIS — G80.0 SPASTIC QUADRIPLEGIC CEREBRAL PALSY: Chronic | ICD-10-CM

## 2019-10-25 DIAGNOSIS — R63.5 WEIGHT GAIN: ICD-10-CM

## 2019-10-25 DIAGNOSIS — G80.0 SPASTIC QUADRIPLEGIC CEREBRAL PALSY: Primary | Chronic | ICD-10-CM

## 2019-10-25 PROCEDURE — 99999 PR PBB SHADOW E&M-EST. PATIENT-LVL III: CPT | Mod: PBBFAC,,, | Performed by: PSYCHIATRY & NEUROLOGY

## 2019-10-25 PROCEDURE — 99214 OFFICE O/P EST MOD 30 MIN: CPT | Mod: S$PBB,,, | Performed by: PSYCHIATRY & NEUROLOGY

## 2019-10-25 PROCEDURE — 36415 COLL VENOUS BLD VENIPUNCTURE: CPT

## 2019-10-25 PROCEDURE — 99214 PR OFFICE/OUTPT VISIT, EST, LEVL IV, 30-39 MIN: ICD-10-PCS | Mod: S$PBB,,, | Performed by: PSYCHIATRY & NEUROLOGY

## 2019-10-25 PROCEDURE — 99999 PR PBB SHADOW E&M-EST. PATIENT-LVL III: ICD-10-PCS | Mod: PBBFAC,,, | Performed by: PSYCHIATRY & NEUROLOGY

## 2019-10-25 PROCEDURE — 99213 OFFICE O/P EST LOW 20 MIN: CPT | Mod: PBBFAC | Performed by: PSYCHIATRY & NEUROLOGY

## 2019-10-25 PROCEDURE — 80183 DRUG SCRN QUANT OXCARBAZEPIN: CPT

## 2019-10-25 RX ORDER — OXCARBAZEPINE 60 MG/ML
5 SUSPENSION ORAL 2 TIMES DAILY
COMMUNITY

## 2019-10-25 RX ORDER — AMITRIPTYLINE HYDROCHLORIDE 75 MG/1
75 TABLET ORAL NIGHTLY
Qty: 30 TABLET | Refills: 11 | Status: SHIPPED | OUTPATIENT
Start: 2019-10-25 | End: 2020-10-30 | Stop reason: SDUPTHER

## 2019-10-25 RX ORDER — BACLOFEN 20 MG/1
20 TABLET ORAL 3 TIMES DAILY
Qty: 90 TABLET | Refills: 11 | Status: SHIPPED | OUTPATIENT
Start: 2019-10-25 | End: 2020-10-30 | Stop reason: SDUPTHER

## 2019-10-25 RX ORDER — GABAPENTIN 250 MG/5ML
250 SOLUTION ORAL 3 TIMES DAILY
Qty: 450 ML | Refills: 11 | Status: SHIPPED | OUTPATIENT
Start: 2019-10-25 | End: 2020-10-30 | Stop reason: SDUPTHER

## 2019-10-25 RX ORDER — GABAPENTIN 250 MG/5ML
300 SOLUTION ORAL 3 TIMES DAILY
COMMUNITY
End: 2019-10-25 | Stop reason: SDUPTHER

## 2019-10-25 RX ORDER — BACLOFEN 10 MG/1
20 TABLET ORAL 3 TIMES DAILY
Qty: 90 TABLET | Refills: 11 | Status: SHIPPED | OUTPATIENT
Start: 2019-10-25 | End: 2019-10-25 | Stop reason: SDUPTHER

## 2019-10-25 RX ORDER — SERTRALINE HYDROCHLORIDE 100 MG/1
100 TABLET, FILM COATED ORAL DAILY
COMMUNITY

## 2019-10-25 NOTE — PROGRESS NOTES
Subjective:       Patient ID: Sandro Ojeda is a 29 y.o. female.    Chief Complaint: Cerebral Palsy and Spasms    HPI     The patient with Spastic Quadriplegic CP who is here to establish care.  Her main problem right right now is related to muscle spasms. She is on Baclofen 10/20 mg BID,  mg BID and Elavil 50 mg for QHS for headaches which are well-controlled. No seizures. She is on [facility] for Bipolar Disorder.       Review of Systems   Constitutional: Negative for appetite change and fatigue.   HENT: Negative for hearing loss and tinnitus.    Eyes: Negative for photophobia and visual disturbance.   Respiratory: Negative for apnea and shortness of breath.    Cardiovascular: Negative for chest pain and palpitations.   Gastrointestinal: Negative for nausea and vomiting.   Endocrine: Negative for cold intolerance and heat intolerance.   Genitourinary: Negative for difficulty urinating and urgency.   Musculoskeletal: Positive for arthralgias and gait problem. Negative for back pain, joint swelling, myalgias, neck pain and neck stiffness.   Skin: Negative for color change and rash.   Allergic/Immunologic: Negative for environmental allergies and immunocompromised state.   Neurological: Positive for speech difficulty, weakness and headaches. Negative for dizziness, tremors, seizures, syncope, facial asymmetry, light-headedness and numbness.   Hematological: Negative for adenopathy. Does not bruise/bleed easily.   Psychiatric/Behavioral: Positive for dysphoric mood. Negative for agitation, behavioral problems, confusion, decreased concentration, hallucinations, self-injury, sleep disturbance and suicidal ideas. The patient is nervous/anxious. The patient is not hyperactive.          Current Outpatient Medications:     amitriptyline (ELAVIL) 75 MG tablet, Take 1 tablet (75 mg total) by mouth every evening., Disp: 30 tablet, Rfl: 11    ARIPiprazole (ABILIFY) 400 mg sers syringe, Inject 400 mg into the muscle every  28 days., Disp: , Rfl:     baclofen (LIORESAL) 20 MG tablet, Take 1 tablet (20 mg total) by mouth 3 (three) times daily., Disp: 90 tablet, Rfl: 11    cetirizine (ZYRTEC) 10 MG tablet, Take 10 mg by mouth once daily., Disp: , Rfl:     gabapentin (NEURONTIN) 250 mg/5 mL solution, Take 5 mLs (250 mg total) by mouth 3 (three) times daily., Disp: 450 mL, Rfl: 11    metoprolol tartrate (LOPRESSOR) 25 MG tablet, Take 25 mg by mouth., Disp: , Rfl:     OXcarbazepine (TRILEPTAL) 300 mg/5 mL (60 mg/mL) Susp, Take 5 mg by mouth 2 (two) times daily., Disp: , Rfl:     pantoprazole (PROTONIX) 40 MG tablet, Take 40 mg by mouth once daily., Disp: , Rfl:     sertraline (ZOLOFT) 100 MG tablet, Take 100 mg by mouth once daily., Disp: , Rfl:     tamsulosin (FLOMAX) 0.4 mg Cp24, Take 0.4 mg by mouth once daily., Disp: , Rfl:     magnesium oxide (MAG-OX) 400 mg (241.3 mg magnesium) tablet, Take 1 tablet (400 mg total) by mouth 2 (two) times daily., Disp: , Rfl: 0    mometasone (NASONEX) 50 mcg/actuation nasal spray, 2 sprays by Nasal route., Disp: , Rfl:   Past Medical History:   Diagnosis Date    Bipolar disorder 7/25/2018    GERD (gastroesophageal reflux disease)     Headache(784.0)     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    Tension headache 7/25/2018     Past Surgical History:   Procedure Laterality Date    BACK SURGERY      EYE SURGERY      HIP SURGERY       Social History     Socioeconomic History    Marital status: Single     Spouse name: Not on file    Number of children: Not on file    Years of education: Not on file    Highest education level: Not on file   Occupational History    Not on file   Social Needs    Financial resource strain: Not on file    Food insecurity:     Worry: Not on file     Inability: Not on file    Transportation needs:     Medical: Not on file     Non-medical: Not on file   Tobacco Use    Smoking status: Never Smoker    Smokeless tobacco: Never Used   Substance and  Sexual Activity    Alcohol use: No    Drug use: No    Sexual activity: Never   Lifestyle    Physical activity:     Days per week: Not on file     Minutes per session: Not on file    Stress: Not on file   Relationships    Social connections:     Talks on phone: Not on file     Gets together: Not on file     Attends Orthodoxy service: Not on file     Active member of club or organization: Not on file     Attends meetings of clubs or organizations: Not on file     Relationship status: Not on file   Other Topics Concern    Not on file   Social History Narrative    Not on file       Objective:           VITAL SIGNS REVIEWED    GENERAL APPEARANCE:     The patient looks comfortable.    No signs of medical or psychiatric distress.    Normal breathing pattern.    No dysmorphic features    Normal eye contact.     GENERAL MEDICAL EXAM:    HEENT:  Head is atraumatic normocephalic.     Neck and Axillae: No JVD. No thyromegaly.     Cardiopulmonary: No cyanosis. No tachypnea. Normal respiratory effort.    Gastrointestinal:  No stomas or lesions. No hernias.    Skin, Hair and Nails: No pathognonomic skin rash. No neurofibromatosis. No stigmata of autoimmune disease.     Limbs: No varicose veins. No edema.    Muskoskeletal: Flexion deformities.No signs of longstanding neuropathy. No dislocations or fractures.        Neurologic Exam     Mental Status   Oriented to person, place, and time.   Registration: recalls 3 of 3 objects. Recall at 5 minutes: recalls 3 of 3 objects. Follows 3 step commands.   Attention: normal. Concentration: normal.   Speech: slurred   Level of consciousness: alert  Knowledge: good and consistent with education. Able to perform simple calculations.   Able to name object. Able to repeat. Normal comprehension.     Spastic Dysarthria      Cranial Nerves     CN II   Visual acuity: decreased  Right visual field deficit: none    CN III, IV, VI   Pupils are equal, round, and reactive to light.  Extraocular  motions are normal.   Right pupil: Size: 2 mm. Shape: regular. Reactivity: brisk. Consensual response: intact. Accommodation: intact.   Left pupil: Size: 2 mm. Shape: regular. Reactivity: brisk. Consensual response: intact. Accommodation: intact.   CN III: no CN III palsy  CN VI: right CN VI palsy  Nystagmus: none   Diplopia: right  Ophthalmoparesis: right abduction  Upgaze: normal  Downgaze: normal  Conjugate gaze: present  Vestibulo-ocular reflex: present    CN V   Facial sensation intact.   Right facial sensation deficit: none  Left facial sensation deficit: none  Right corneal reflex: normal  Left corneal reflex: normal    CN VII   Right facial weakness: none  Left facial weakness: none  Right taste: normal  Left taste: normal    CN VIII   CN VIII normal.   Hearing: intact  Right Rinne: AC > BC  Left Rinne: AC > BC  Alexander: does not lateralize     CN IX, X   CN IX normal.   CN X normal.   Palate: symmetric  Right gag reflex: abnormal  Left gag reflex: abnormal    CN XI   CN XI normal.   Right sternocleidomastoid strength: weak  Left sternocleidomastoid strength: weak  Right trapezius strength: weak  Left trapezius strength: weak    CN XII   CN XII normal.   Tongue: not atrophic  Fasciculations: absent  Tongue deviation: none    Motor Exam   Muscle bulk: decreased  Overall muscle tone: increased  Right arm tone: spastic  Left arm tone: spastic  Right arm pronator drift: absent  Left arm pronator drift: absent  Right leg tone: spastic  Left leg tone: spastic    Strength   Right neck flexion: 4/5  Left neck flexion: 4/5  Right neck extension: 4/5  Left neck extension: 4/5  Right deltoid: 4/5  Left deltoid: 4/5  Right biceps: 4/5  Left biceps: 4/5  Right triceps: 3/5  Left triceps: 3/5  Right wrist flexion: 2/5  Left wrist flexion: 5/5  Right wrist extension: 2/5  Left wrist extension: 2/5  Right interossei: 1/5  Left interossei: 1/5  Right iliopsoas: 4/5  Left iliopsoas: 4/5  Right quadriceps: 3/5  Left quadriceps:  3/5  Right hamstring: 3/5  Left hamstring: 3/5  Right glutei: 3/5  Left glutei: 3/5  Right anterior tibial: 2/5  Left anterior tibial: 2/5  Right posterior tibial: 2/5  Left posterior tibial: 2/5  Right peroneal: 1/5  Left peroneal: 1/5  Right gastroc: 1/5  Left gastroc: 1/5    Sensory Exam   Light touch normal.   Right arm light touch: normal  Left arm light touch: normal  Right leg light touch: normal  Left leg light touch: normal  Right arm vibration: decreased from fingers  Left arm vibration: decreased from fingers  Right leg vibration: decreased from toes  Left leg vibration: decreased from toes  Right arm proprioception: decreased from fingers  Left arm proprioception: decreased from fingers  Right leg proprioception: decreased from toes  Left leg proprioception: decreased from toes  Pinprick normal.   Right arm pinprick: normal  Left arm pinprick: normal  Right leg pinprick: normal  Left leg pinprick: normal  Graphesthesia: normal  Stereognosis: normal    Gait, Coordination, and Reflexes     Gait  Gait: (WC-bound)    Coordination   Finger to nose coordination: abnormal  Heel to shin coordination: abnormal    Tremor   Resting tremor: absent  Intention tremor: absent  Action tremor: absent    Reflexes   Right brachioradialis: 3+  Left brachioradialis: 3+  Right biceps: 3+  Left biceps: 3+  Right triceps: 3+  Left triceps: 3+  Right patellar: 3+  Left patellar: 3+  Right achilles: 3+  Left achilles: 3+  Right plantar: upgoing  Left plantar: upgoing  Right Varela: present  Left Varela: present  Right ankle clonus: present  Left ankle clonus: present  Right pendular knee jerk: present  Left pendular knee jerk: present      Lab Results   Component Value Date    WBC 6.96 10/01/2014    HGB 13.8 10/01/2014    HCT 40.0 10/01/2014    MCV 95 10/01/2014     10/01/2014     Sodium   Date Value Ref Range Status   10/01/2014 140 136 - 145 mmol/L Final     Potassium   Date Value Ref Range Status   10/01/2014 3.9 3.5  - 5.1 mmol/L Final     Chloride   Date Value Ref Range Status   10/01/2014 106 95 - 110 mmol/L Final     CO2   Date Value Ref Range Status   10/01/2014 21 (L) 23 - 29 mmol/L Final     Glucose   Date Value Ref Range Status   10/01/2014 89 70 - 110 mg/dL Final     BUN, Bld   Date Value Ref Range Status   10/01/2014 11 6 - 20 mg/dL Final     Creatinine   Date Value Ref Range Status   10/01/2014 0.7 0.5 - 1.4 mg/dL Final     Calcium   Date Value Ref Range Status   10/01/2014 9.9 8.7 - 10.5 mg/dL Final     Total Protein   Date Value Ref Range Status   10/01/2014 7.6 6.0 - 8.4 g/dL Final     Albumin   Date Value Ref Range Status   10/01/2014 3.7 3.5 - 5.2 g/dL Final     Total Bilirubin   Date Value Ref Range Status   10/01/2014 0.4 0.1 - 1.0 mg/dL Final     Comment:     For infants and newborns, interpretation of results should be based  on gestational age, weight and in agreement with clinical  observations.  Premature Infant recommended reference ranges:  Up to 24 hours.............<8.0 mg/dL  Up to 48 hours............<12.0 mg/dL  3-5 days..................<15.0 mg/dL  6-29 days.................<15.0 mg/dL       Alkaline Phosphatase   Date Value Ref Range Status   10/01/2014 63 55 - 135 U/L Final     AST   Date Value Ref Range Status   10/01/2014 18 10 - 40 U/L Final     ALT   Date Value Ref Range Status   10/01/2014 22 10 - 44 U/L Final     Anion Gap   Date Value Ref Range Status   10/01/2014 13 8 - 16 mmol/L Final     eGFR if    Date Value Ref Range Status   10/01/2014 >60.0 >60 mL/min/1.73 m^2 Final     eGFR if non    Date Value Ref Range Status   10/01/2014 >60.0 >60 mL/min/1.73 m^2 Final     Comment:     Calculation used to obtain the estimated glomerular filtration  rate (eGFR) is the CKD-EPI equation. Since race is unknown   in our information system, the eGFR values for   -American and Non--American patients are given   for each creatinine result.       Lab Results    Component Value Date    [id number] 434 12/11/2018     Lab Results   Component Value Date    TSH 0.995 12/11/2018 11-    EEG NL      02- THROUGH 02-     AEEG 85 Hours (INTERPRETED 02-)    PGE, SSW, Jonathan       Reviewed the neuroimaging independently       Assessment:       1. Spastic quadriplegic cerebral palsy    2. Bipolar disorder, in partial remission, most recent episode depressed    3. Chronic migraine    4. Weight gain    5. CVA, old, hemiparesis    6. Tension headache    7. Muscle spasms of both lower extremities        Plan:             SPASIC QUADRIPARETIC CP  WITH MUSCLE SPASMS AND TENSION HEADACHET    Change Baclofen to 20 mg TID    Change GBP to 250 mg TID    Change Elavil to 75 mg QHS    OXC-MHD Level    HA1C                   MEDICAL/SURGICAL COMORBIDITIES     All relevant medical comorbidities noted and managed by primary care physician and medical care team.        RTC  Annually                 Robert Liu MD, FAAN    Attending Neurologist/Epileptologist         Diplomate, American Board-Psychiatry and Neurology (Neurology)    Diplomate, American Board-Clinical Neurophysiology (Epilpesy-Neuromuscular)     Fellow, American Academy of Neurology

## 2019-10-25 NOTE — LETTER
October 25, 2019      Dina Li MD  [address] [facility] [address]           [facility]  [address] [place] [address]  Phone: [phone]  Fax: [phone]          Patient: Sandro Ojeda   MR Number: [id number]   YOB: 1990   Date of Visit: 10/25/2019       Dear Dr. Dina Li:    Thank you for referring Sandro Ojeda to me for evaluation. Attached you will find relevant portions of my assessment and plan of care.    If you have questions, please do not hesitate to call me. I look forward to following Sandro Ojeda along with you.    Sincerely,    Robert Liu MD    Enclosure  CC:  No Recipients    If you would like to receive this communication electronically, please contact [email or web address] or [phone] to request more information on [employer] Link access.    For providers and/or their staff who would like to refer a patient to [facility], please contact us through our one-stop-shop provider referral line, [facility], at [phone].    If you feel you have received this communication in error or would no longer like to receive these types of communications, please e-mail [email or web address]

## 2019-10-25 NOTE — PATIENT INSTRUCTIONS
Muscle Spasm  A muscle spasm (also called a cramp) is an involuntary muscle contraction. The muscle tightens quickly and strongly. A hard lump may form in the muscle. Muscle spasms are very painful. Read on to learn more about muscle spasms and how to treat and prevent them.    What causes muscles to spasm?  Often, the cause of a muscle spasm is not known. Muscle spasm is due to irritation of muscle fibers. Some things can make a muscle spasm more likely. These include:  · Injury  · Heavy exercise  · Overtired muscles  · A muscle held in one position for a long time  · Dehydration  · Low levels of certain minerals in the body  · Taking certain medications, such as diuretics or water pills  · Certain medical conditions, such as kidney failure or diabetes  · Being pregnant  Stopping a muscle spasm  Muscle spasms often come and go quickly. When a muscle goes into spasm, very gently stretch and massage the muscle. This may help calm the muscle fibers. Then rest the muscle.  Preventing muscle spasms  Although there is little or no evidence that staying hydrated, taking certain vitamins or minerals or stretching works to prevent cramps, these measures may help and have other benefits. Talk to your health care provider about steps to take to avoid muscle spasms. These may include:  · Drinking enough fluids to avoid dehydration, especially when you exercise.  · Taking vitamin or mineral supplements.  · Getting regular exercise.  · Stretching regularly, especially before exercise.  · Limit caffeine and smoking.  · Taking a prescription muscle relaxant.  When to call your doctor  Call your doctor if you have any of the following:  · Severe cramping  · Cramping that lasts a long time, does not go away with stretching, or keeps coming back  · Pain, tingling, or weakness in the arms or legs  · Pain that wakes you up at night   Date Last Reviewed: 9/1/2015  © 5036-2487 [employer]. [address] [place] [address], [facility]  [place] [address]. All rights reserved. This information is not intended as a substitute for professional medical care. Always follow your healthcare professional's instructions.

## 2019-10-29 LAB — OXCARBAZEPINE METABOLITE: 25 MCG/ML (ref 3–35)

## 2020-10-30 DIAGNOSIS — R63.5 WEIGHT GAIN: ICD-10-CM

## 2020-10-30 DIAGNOSIS — G80.0 SPASTIC QUADRIPLEGIC CEREBRAL PALSY: Chronic | ICD-10-CM

## 2020-10-30 DIAGNOSIS — F31.75 BIPOLAR DISORDER, IN PARTIAL REMISSION, MOST RECENT EPISODE DEPRESSED: Chronic | ICD-10-CM

## 2020-10-30 DIAGNOSIS — G44.209 TENSION HEADACHE: ICD-10-CM

## 2020-10-30 DIAGNOSIS — I69.359 CVA, OLD, HEMIPARESIS: ICD-10-CM

## 2020-10-30 DIAGNOSIS — M62.838 MUSCLE SPASMS OF BOTH LOWER EXTREMITIES: ICD-10-CM

## 2020-10-30 RX ORDER — AMITRIPTYLINE HYDROCHLORIDE 75 MG/1
75 TABLET ORAL NIGHTLY
Qty: 30 TABLET | Refills: 11 | Status: SHIPPED | OUTPATIENT
Start: 2020-10-30 | End: 2021-11-26 | Stop reason: SDUPTHER

## 2020-10-30 RX ORDER — BACLOFEN 20 MG/1
20 TABLET ORAL 3 TIMES DAILY
Qty: 90 TABLET | Refills: 11 | Status: SHIPPED | OUTPATIENT
Start: 2020-10-30 | End: 2021-12-02 | Stop reason: SDUPTHER

## 2020-10-30 RX ORDER — GABAPENTIN 250 MG/5ML
250 SOLUTION ORAL 3 TIMES DAILY
Qty: 450 ML | Refills: 11 | Status: SHIPPED | OUTPATIENT
Start: 2020-10-30 | End: 2021-11-26 | Stop reason: SDUPTHER

## 2020-10-30 NOTE — TELEPHONE ENCOUNTER
----- Message from Lalo Tabor sent at 10/30/2020 10:48 AM CDT -----  ..Type:  RX Refill Request    Who Called: Jhoan ( jayla dawson )   Refill or New Rx: refill   RX Name and Strength: baclofen amitriptyline gabapentin  How is the patient currently taking it? (ex. 1XDay): daily   Is this a 30 day or 90 day RX: 30  Preferred Pharmacy with phone number:.  [facility] - [address] [address]  Phone: [phone] Fax: [phone]     Local or Mail Order local   Ordering Provider:  Would the patient rather a call back or a response via [facility]?  Call back   Best Call Back Number: [phone]  Additional Information:

## 2021-11-05 ENCOUNTER — TELEPHONE (OUTPATIENT)
Dept: NEUROLOGY | Facility: CLINIC | Age: 31
End: 2021-11-05
Payer: MEDICAID

## 2021-11-12 ENCOUNTER — LAB VISIT (OUTPATIENT)
Dept: LAB | Facility: HOSPITAL | Age: 31
End: 2021-11-12
Attending: FAMILY MEDICINE
Payer: MEDICAID

## 2021-11-12 ENCOUNTER — OFFICE VISIT (OUTPATIENT)
Dept: NEUROLOGY | Facility: CLINIC | Age: 31
End: 2021-11-12
Payer: MEDICAID

## 2021-11-12 VITALS
HEIGHT: 64 IN | DIASTOLIC BLOOD PRESSURE: 72 MMHG | SYSTOLIC BLOOD PRESSURE: 114 MMHG | RESPIRATION RATE: 16 BRPM | HEART RATE: 65 BPM | BODY MASS INDEX: 24.03 KG/M2 | OXYGEN SATURATION: 77 %

## 2021-11-12 DIAGNOSIS — G80.0 SPASTIC QUADRIPLEGIC CEREBRAL PALSY: ICD-10-CM

## 2021-11-12 DIAGNOSIS — R56.9 SEIZURE-LIKE ACTIVITY: ICD-10-CM

## 2021-11-12 DIAGNOSIS — M62.838 MUSCLE SPASMS OF BOTH LOWER EXTREMITIES: ICD-10-CM

## 2021-11-12 DIAGNOSIS — I69.359 CVA, OLD, HEMIPARESIS: ICD-10-CM

## 2021-11-12 DIAGNOSIS — F31.75 BIPOLAR DISORDER, IN PARTIAL REMISSION, MOST RECENT EPISODE DEPRESSED: ICD-10-CM

## 2021-11-12 DIAGNOSIS — G44.209 TENSION HEADACHE: ICD-10-CM

## 2021-11-12 DIAGNOSIS — R29.90 MULTIPLE NEUROLOGICAL SYMPTOMS: Primary | ICD-10-CM

## 2021-11-12 LAB
ALBUMIN SERPL BCP-MCNC: 3.8 G/DL (ref 3.5–5.2)
ALP SERPL-CCNC: 63 U/L (ref 55–135)
ALT SERPL W/O P-5'-P-CCNC: 32 U/L (ref 10–44)
ANION GAP SERPL CALC-SCNC: 11 MMOL/L (ref 8–16)
AST SERPL-CCNC: 23 U/L (ref 10–40)
BASOPHILS # BLD AUTO: 0.02 K/UL (ref 0–0.2)
BASOPHILS NFR BLD: 0.5 % (ref 0–1.9)
BILIRUB SERPL-MCNC: 0.4 MG/DL (ref 0.1–1)
BUN SERPL-MCNC: 7 MG/DL (ref 6–20)
CALCIUM SERPL-MCNC: 10.1 MG/DL (ref 8.7–10.5)
CHLORIDE SERPL-SCNC: 102 MMOL/L (ref 95–110)
CO2 SERPL-SCNC: 25 MMOL/L (ref 23–29)
CREAT SERPL-MCNC: 0.5 MG/DL (ref 0.5–1.4)
DIFFERENTIAL METHOD: ABNORMAL
EOSINOPHIL # BLD AUTO: 0 K/UL (ref 0–0.5)
EOSINOPHIL NFR BLD: 0.3 % (ref 0–8)
ERYTHROCYTE [DISTWIDTH] IN BLOOD BY AUTOMATED COUNT: 12.4 % (ref 11.5–14.5)
EST. GFR  (AFRICAN AMERICAN): >60 ML/MIN/1.73 M^2
EST. GFR  (NON AFRICAN AMERICAN): >60 ML/MIN/1.73 M^2
ESTIMATED AVG GLUCOSE: 88 MG/DL (ref 68–131)
GLUCOSE SERPL-MCNC: 76 MG/DL (ref 70–110)
HBA1C MFR BLD: 4.7 % (ref 4–5.6)
HCT VFR BLD AUTO: 44.7 % (ref 37–48.5)
HGB BLD-MCNC: 14.4 G/DL (ref 12–16)
IMM GRANULOCYTES # BLD AUTO: 0.01 K/UL (ref 0–0.04)
IMM GRANULOCYTES NFR BLD AUTO: 0.3 % (ref 0–0.5)
LYMPHOCYTES # BLD AUTO: 1.2 K/UL (ref 1–4.8)
LYMPHOCYTES NFR BLD: 31.9 % (ref 18–48)
MCH RBC QN AUTO: 33.3 PG (ref 27–31)
MCHC RBC AUTO-ENTMCNC: 32.2 G/DL (ref 32–36)
MCV RBC AUTO: 103 FL (ref 82–98)
MONOCYTES # BLD AUTO: 0.3 K/UL (ref 0.3–1)
MONOCYTES NFR BLD: 8.2 % (ref 4–15)
NEUTROPHILS # BLD AUTO: 2.3 K/UL (ref 1.8–7.7)
NEUTROPHILS NFR BLD: 58.8 % (ref 38–73)
NRBC BLD-RTO: 0 /100 WBC
PLATELET # BLD AUTO: 198 K/UL (ref 150–450)
PMV BLD AUTO: 12.1 FL (ref 9.2–12.9)
POTASSIUM SERPL-SCNC: 4.6 MMOL/L (ref 3.5–5.1)
PROT SERPL-MCNC: 7.8 G/DL (ref 6–8.4)
RBC # BLD AUTO: 4.33 M/UL (ref 4–5.4)
SODIUM SERPL-SCNC: 138 MMOL/L (ref 136–145)
WBC # BLD AUTO: 3.89 K/UL (ref 3.9–12.7)

## 2021-11-12 PROCEDURE — 99999 PR PBB SHADOW E&M-EST. PATIENT-LVL V: ICD-10-PCS | Mod: PBBFAC,,, | Performed by: NURSE PRACTITIONER

## 2021-11-12 PROCEDURE — 99999 PR PBB SHADOW E&M-EST. PATIENT-LVL V: CPT | Mod: PBBFAC,,, | Performed by: NURSE PRACTITIONER

## 2021-11-12 PROCEDURE — 99215 PR OFFICE/OUTPT VISIT, EST, LEVL V, 40-54 MIN: ICD-10-PCS | Mod: S$PBB,,, | Performed by: NURSE PRACTITIONER

## 2021-11-12 PROCEDURE — 36415 COLL VENOUS BLD VENIPUNCTURE: CPT | Performed by: NURSE PRACTITIONER

## 2021-11-12 PROCEDURE — 80053 COMPREHEN METABOLIC PANEL: CPT | Performed by: NURSE PRACTITIONER

## 2021-11-12 PROCEDURE — 80183 DRUG SCRN QUANT OXCARBAZEPIN: CPT | Performed by: NURSE PRACTITIONER

## 2021-11-12 PROCEDURE — 85025 COMPLETE CBC W/AUTO DIFF WBC: CPT | Performed by: NURSE PRACTITIONER

## 2021-11-12 PROCEDURE — 99215 OFFICE O/P EST HI 40 MIN: CPT | Mod: PBBFAC | Performed by: NURSE PRACTITIONER

## 2021-11-12 PROCEDURE — 99215 OFFICE O/P EST HI 40 MIN: CPT | Mod: S$PBB,,, | Performed by: NURSE PRACTITIONER

## 2021-11-12 PROCEDURE — 83036 HEMOGLOBIN GLYCOSYLATED A1C: CPT | Performed by: NURSE PRACTITIONER

## 2021-11-15 ENCOUNTER — DOCUMENTATION ONLY (OUTPATIENT)
Dept: NEUROLOGY | Facility: CLINIC | Age: 31
End: 2021-11-15
Payer: MEDICAID

## 2021-11-16 ENCOUNTER — TELEPHONE (OUTPATIENT)
Dept: NEUROLOGY | Facility: CLINIC | Age: 31
End: 2021-11-16
Payer: MEDICAID

## 2021-11-16 DIAGNOSIS — F40.240 CLAUSTROPHOBIA: Primary | ICD-10-CM

## 2021-11-16 LAB — OXCARBAZEPINE METABOLITE: 3 MCG/ML (ref 10–35)

## 2021-11-16 RX ORDER — DIAZEPAM 5 MG/5ML
5 SOLUTION ORAL ONCE
Qty: 5 ML | Refills: 0 | Status: SHIPPED | OUTPATIENT
Start: 2021-11-16 | End: 2022-09-09

## 2021-11-23 ENCOUNTER — HOSPITAL ENCOUNTER (OUTPATIENT)
Dept: PULMONOLOGY | Facility: HOSPITAL | Age: 31
Discharge: HOME | End: 2021-11-23
Attending: NURSE PRACTITIONER
Payer: MEDICAID

## 2021-11-23 DIAGNOSIS — R56.9 SEIZURE-LIKE ACTIVITY: ICD-10-CM

## 2021-11-23 DIAGNOSIS — F31.75 BIPOLAR DISORDER, IN PARTIAL REMISSION, MOST RECENT EPISODE DEPRESSED: ICD-10-CM

## 2021-11-23 DIAGNOSIS — M62.838 MUSCLE SPASMS OF BOTH LOWER EXTREMITIES: ICD-10-CM

## 2021-11-23 DIAGNOSIS — I69.359 CVA, OLD, HEMIPARESIS: ICD-10-CM

## 2021-11-23 DIAGNOSIS — G80.0 SPASTIC QUADRIPLEGIC CEREBRAL PALSY: ICD-10-CM

## 2021-11-23 PROCEDURE — 95816 EEG AWAKE AND DROWSY: CPT | Mod: 26,,, | Performed by: PSYCHIATRY & NEUROLOGY

## 2021-11-23 PROCEDURE — 95816 EEG AWAKE AND DROWSY: CPT

## 2021-11-23 PROCEDURE — 95816 PR EEG,W/AWAKE & DROWSY RECORD: ICD-10-PCS | Mod: 26,,, | Performed by: PSYCHIATRY & NEUROLOGY

## 2021-11-24 ENCOUNTER — TELEPHONE (OUTPATIENT)
Dept: NEUROLOGY | Facility: CLINIC | Age: 31
End: 2021-11-24
Payer: MEDICAID

## 2021-11-26 DIAGNOSIS — I69.359 CVA, OLD, HEMIPARESIS: ICD-10-CM

## 2021-11-26 DIAGNOSIS — G43.709 CHRONIC MIGRAINE W/O AURA W/O STATUS MIGRAINOSUS, NOT INTRACTABLE: ICD-10-CM

## 2021-11-26 DIAGNOSIS — G80.0 SPASTIC QUADRIPLEGIC CEREBRAL PALSY: Chronic | ICD-10-CM

## 2021-11-26 DIAGNOSIS — F31.75 BIPOLAR DISORDER, IN PARTIAL REMISSION, MOST RECENT EPISODE DEPRESSED: Chronic | ICD-10-CM

## 2021-11-26 DIAGNOSIS — M62.838 MUSCLE SPASMS OF BOTH LOWER EXTREMITIES: ICD-10-CM

## 2021-11-26 DIAGNOSIS — R63.5 WEIGHT GAIN: ICD-10-CM

## 2021-11-26 DIAGNOSIS — G44.209 TENSION HEADACHE: ICD-10-CM

## 2021-11-26 RX ORDER — AMITRIPTYLINE HYDROCHLORIDE 75 MG/1
75 TABLET ORAL NIGHTLY
Qty: 30 TABLET | Refills: 11 | Status: SHIPPED | OUTPATIENT
Start: 2021-11-26 | End: 2022-03-18

## 2021-11-26 RX ORDER — GABAPENTIN 250 MG/5ML
250 SOLUTION ORAL 3 TIMES DAILY
Qty: 450 ML | Refills: 11 | Status: SHIPPED | OUTPATIENT
Start: 2021-11-26

## 2021-12-02 DIAGNOSIS — I69.359 CVA, OLD, HEMIPARESIS: ICD-10-CM

## 2021-12-02 DIAGNOSIS — M62.838 MUSCLE SPASMS OF BOTH LOWER EXTREMITIES: ICD-10-CM

## 2021-12-02 DIAGNOSIS — R63.5 WEIGHT GAIN: ICD-10-CM

## 2021-12-02 RX ORDER — BACLOFEN 20 MG/1
20 TABLET ORAL 3 TIMES DAILY
Qty: 90 TABLET | Refills: 11 | Status: SHIPPED | OUTPATIENT
Start: 2021-12-02

## 2021-12-09 ENCOUNTER — TELEPHONE (OUTPATIENT)
Dept: RADIOLOGY | Facility: HOSPITAL | Age: 31
End: 2021-12-09
Payer: MEDICAID

## 2022-01-05 ENCOUNTER — TELEPHONE (OUTPATIENT)
Dept: NEUROLOGY | Facility: CLINIC | Age: 32
End: 2022-01-05
Payer: MEDICAID

## 2022-01-05 NOTE — TELEPHONE ENCOUNTER
----- Message from Liya Adair sent at 1/5/2022  2:58 PM CST -----  Contact: Елена / sister  Called in regards to the pt needing a sooner appt, she states the pt had a seizure on yesterday and she needs an sooner appt but it have to be within two business days for transportation. Please give her a call back at [phone]

## 2022-01-05 NOTE — TELEPHONE ENCOUNTER
Spoke with patient sister. Patient is schedule Monday due to transportation service. ETHEL-LANETTE

## 2022-01-06 ENCOUNTER — TELEPHONE (OUTPATIENT)
Dept: NEUROLOGY | Facility: CLINIC | Age: 32
End: 2022-01-06
Payer: MEDICAID

## 2022-01-06 ENCOUNTER — HOSPITAL ENCOUNTER (OUTPATIENT)
Dept: RADIOLOGY | Facility: HOSPITAL | Age: 32
Discharge: HOME | End: 2022-01-06
Attending: NURSE PRACTITIONER
Payer: MEDICAID

## 2022-01-06 DIAGNOSIS — G80.0 SPASTIC QUADRIPLEGIC CEREBRAL PALSY: ICD-10-CM

## 2022-01-06 DIAGNOSIS — M62.838 MUSCLE SPASMS OF BOTH LOWER EXTREMITIES: ICD-10-CM

## 2022-01-06 DIAGNOSIS — I69.359 CVA, OLD, HEMIPARESIS: ICD-10-CM

## 2022-01-06 DIAGNOSIS — F31.75 BIPOLAR DISORDER, IN PARTIAL REMISSION, MOST RECENT EPISODE DEPRESSED: ICD-10-CM

## 2022-01-06 DIAGNOSIS — R56.9 SEIZURE-LIKE ACTIVITY: ICD-10-CM

## 2022-01-06 PROCEDURE — 70551 MRI BRAIN EPILEPSY WITHOUT CONTRAST: ICD-10-PCS | Mod: 26,,, | Performed by: RADIOLOGY

## 2022-01-06 PROCEDURE — 70551 MRI BRAIN STEM W/O DYE: CPT | Mod: TC

## 2022-01-06 PROCEDURE — 70551 MRI BRAIN STEM W/O DYE: CPT | Mod: 26,,, | Performed by: RADIOLOGY

## 2022-01-06 NOTE — PROGRESS NOTES
MRI Brain Reviewed    1-6-2021    No acute abnormality. Unremarkable appearance of the hippocampal structures. Sphenoid sinus disease. Remaining nonspecific findings as discussed above and possibly related to patient's cerebral palsy.    If patient having sinus troubles, will send referral to ENT.

## 2022-01-13 ENCOUNTER — TELEPHONE (OUTPATIENT)
Dept: NEUROLOGY | Facility: CLINIC | Age: 32
End: 2022-01-13
Payer: MEDICAID

## 2022-01-13 NOTE — TELEPHONE ENCOUNTER
----- Message from Sherry White sent at 1/13/2022  3:05 PM CST -----  Contact: Елена Christiansen would like a call back in regards to scheduling an appointment to get the patient back on medication for seizures. Please call her at [phone]

## 2022-01-19 ENCOUNTER — OFFICE VISIT (OUTPATIENT)
Dept: NEUROLOGY | Facility: CLINIC | Age: 32
End: 2022-01-19
Payer: MEDICAID

## 2022-01-19 VITALS — RESPIRATION RATE: 95 BRPM | DIASTOLIC BLOOD PRESSURE: 82 MMHG | SYSTOLIC BLOOD PRESSURE: 124 MMHG | HEART RATE: 78 BPM

## 2022-01-19 DIAGNOSIS — G80.0 SPASTIC QUADRIPLEGIC CEREBRAL PALSY: ICD-10-CM

## 2022-01-19 DIAGNOSIS — I69.359 CVA, OLD, HEMIPARESIS: ICD-10-CM

## 2022-01-19 DIAGNOSIS — G44.209 TENSION HEADACHE: ICD-10-CM

## 2022-01-19 DIAGNOSIS — G40.909 SEIZURE DISORDER: Primary | ICD-10-CM

## 2022-01-19 DIAGNOSIS — R56.9 SEIZURE-LIKE ACTIVITY: ICD-10-CM

## 2022-01-19 PROCEDURE — 99215 PR OFFICE/OUTPT VISIT, EST, LEVL V, 40-54 MIN: ICD-10-PCS | Mod: S$PBB,,, | Performed by: NURSE PRACTITIONER

## 2022-01-19 PROCEDURE — 1160F RVW MEDS BY RX/DR IN RCRD: CPT | Mod: CPTII,,, | Performed by: NURSE PRACTITIONER

## 2022-01-19 PROCEDURE — 1159F MED LIST DOCD IN RCRD: CPT | Mod: CPTII,,, | Performed by: NURSE PRACTITIONER

## 2022-01-19 PROCEDURE — 3074F SYST BP LT 130 MM HG: CPT | Mod: CPTII,,, | Performed by: NURSE PRACTITIONER

## 2022-01-19 PROCEDURE — 99214 OFFICE O/P EST MOD 30 MIN: CPT | Mod: PBBFAC | Performed by: NURSE PRACTITIONER

## 2022-01-19 PROCEDURE — 99999 PR PBB SHADOW E&M-EST. PATIENT-LVL IV: CPT | Mod: PBBFAC,,, | Performed by: NURSE PRACTITIONER

## 2022-01-19 PROCEDURE — 3074F PR MOST RECENT SYSTOLIC BLOOD PRESSURE < 130 MM HG: ICD-10-PCS | Mod: CPTII,,, | Performed by: NURSE PRACTITIONER

## 2022-01-19 PROCEDURE — 3079F PR MOST RECENT DIASTOLIC BLOOD PRESSURE 80-89 MM HG: ICD-10-PCS | Mod: CPTII,,, | Performed by: NURSE PRACTITIONER

## 2022-01-19 PROCEDURE — 99999 PR PBB SHADOW E&M-EST. PATIENT-LVL IV: ICD-10-PCS | Mod: PBBFAC,,, | Performed by: NURSE PRACTITIONER

## 2022-01-19 PROCEDURE — 1160F PR REVIEW ALL MEDS BY PRESCRIBER/CLIN PHARMACIST DOCUMENTED: ICD-10-PCS | Mod: CPTII,,, | Performed by: NURSE PRACTITIONER

## 2022-01-19 PROCEDURE — 3079F DIAST BP 80-89 MM HG: CPT | Mod: CPTII,,, | Performed by: NURSE PRACTITIONER

## 2022-01-19 PROCEDURE — 99215 OFFICE O/P EST HI 40 MIN: CPT | Mod: S$PBB,,, | Performed by: NURSE PRACTITIONER

## 2022-01-19 PROCEDURE — 1159F PR MEDICATION LIST DOCUMENTED IN MEDICAL RECORD: ICD-10-PCS | Mod: CPTII,,, | Performed by: NURSE PRACTITIONER

## 2022-01-19 RX ORDER — LEVETIRACETAM 500 MG/1
500 TABLET ORAL 2 TIMES DAILY
Qty: 60 TABLET | Refills: 11 | Status: SHIPPED | OUTPATIENT
Start: 2022-01-19 | End: 2023-01-19

## 2022-01-19 NOTE — PATIENT INSTRUCTIONS
Patient Education       Levetiracetam (dudley bartholomew)   Brand Names: [place] Elepsia XR; Keppra; Keppra XR; Roweepra; Roweepra XR [DSC]; Spritam   Brand Names: [place] ACH-Levetiracetam; APO-Levetiracetam; Auro-Levetiracetam; BIO-Levetiracetam; DOM-Levetiracetam; JAMP-Levetiracetam; Keppra; MINT-Levetiracetam; CONSUELO-Levetiracetam; NRA-Levetiracetam; PDP-Levetiracetam; PMS-Levetiracetam; Priva-Levetiracetam; PRO-Levetiracetam-250; PRO-Levetiracetam-500; PRO-Levetiracetam-750; RAN-Levetiracetam [DSC]; OSCAR-Levetiracetam; SANDOZ Levetiracetam; TEVA-Levetiracetam; VAN-Levetiracetam [DSC]   What is this drug used for?   · It is used to treat seizures.    What do I need to tell my doctor BEFORE I take this drug?   · If you are allergic to this drug; any part of this drug; or any other drugs, foods, or substances. Tell your doctor about the allergy and what signs you had.  · If you have kidney disease or are on dialysis.  This is not a list of all drugs or health problems that interact with this drug.  Tell your doctor and pharmacist about all of your drugs (prescription or OTC, natural products, vitamins) and health problems. You must check to make sure that it is safe for you to take this drug with all of your drugs and health problems. Do not start, stop, or change the dose of any drug without checking with your doctor.  What are some things I need to know or do while I take this drug?   All products:   · Tell all of your health care providers that you take this drug. This includes your doctors, nurses, pharmacists, and dentists.  · Avoid driving and doing other tasks or actions that call for you to be alert until you see how this drug affects you.  · Talk with your doctor before you use alcohol, marijuana or other forms of cannabis, or prescription or OTC drugs that may slow your actions.  · Have blood work checked as you have been told by the doctor. Talk with the doctor.  · If seizures are different or worse after  starting this drug, talk with the doctor.  · Do not stop taking this drug all of a sudden without calling your doctor. You may have a greater risk of seizures. If you need to stop this drug, you will want to slowly stop it as ordered by your doctor.  · A very bad reaction called angioedema has happened with this drug. Sometimes, this may be life-threatening. Signs may include swelling of the hands, face, lips, eyes, tongue, or throat; trouble breathing; trouble swallowing; or unusual hoarseness. Get medical help right away if you have any of these signs.  · If you are 65 or older, use this drug with care. You could have more side effects.  · If the patient is a child, use this drug with care. The risk of some side effects may be higher in children.  · Not all products are meant for use in all children. Talk with the doctor before giving this drug to a child.  · Tell your doctor if you are pregnant, plan on getting pregnant, or are breast-feeding. You will need to talk about the benefits and risks to you and the baby.  · This drug may not work as well to control seizures during pregnancy. If you have questions, talk with the doctor.  Extended-release tablets:   · You may see something that looks like the tablet in your stool. This is normal and not a cause for concern. If you have questions, talk with your doctor.  What are some side effects that I need to call my doctor about right away?   WARNING/CAUTION: Even though it may be rare, some people may have very bad and sometimes deadly side effects when taking a drug. Tell your doctor or get medical help right away if you have any of the following signs or symptoms that may be related to a very bad side effect:  · Signs of an allergic reaction, like rash; hives; itching; red, swollen, blistered, or peeling skin with or without fever; wheezing; tightness in the chest or throat; trouble breathing, swallowing, or talking; unusual hoarseness; or swelling of the mouth,  face, lips, tongue, or throat.  · Hallucinations (seeing or hearing things that are not there).  · Very bad dizziness or passing out.  · Change in balance.  · Trouble walking.  · Like other drugs that may be used for seizures, this drug may rarely raise the risk of suicidal thoughts or actions. The risk may be higher in people who have had suicidal thoughts or actions in the past. Call the doctor right away about any new or worse signs like depression; feeling nervous, restless, or grouchy; panic attacks; or other changes in mood or behavior. Call the doctor right away if any suicidal thoughts or actions occur.  · A severe skin reaction (Bocanegra-Justin syndrome/toxic epidermal necrolysis) may happen. It can cause severe health problems that may not go away, and sometimes death. Get medical help right away if you have signs like red, swollen, blistered, or peeling skin (with or without fever); red or irritated eyes; or sores in your mouth, throat, nose, or eyes.  · Low blood cell counts have happened with this drug. If blood cell counts get very low, this can lead to bleeding problems, infections, or anemia. Call your doctor right away if you have signs of infection like fever, chills, or sore throat; any unexplained bruising or bleeding; or if you feel very tired or weak.  What are some other side effects of this drug?   All drugs may cause side effects. However, many people have no side effects or only have minor side effects. Call your doctor or get medical help if any of these side effects or any other side effects bother you or do not go away:  · Stomach pain or diarrhea.  · Feeling dizzy, sleepy, tired, or weak.  · Nose or throat irritation.  · Trouble sleeping.  · Headache.  · Upset stomach or throwing up.  · Not hungry.  · Flu-like signs.  These are not all of the side effects that may occur. If you have questions about side effects, call your doctor. Call your doctor for medical advice about side  effects.  You may report side effects to your national health agency.  You may report side effects to the FDA at 1-218.578.8365. You may also report side effects at https://www.fda.gov/medwatch.  How is this drug best taken?   Use this drug as ordered by your doctor. Read all information given to you. Follow all instructions closely.  All oral products:   · Take with or without food.  · Keep taking this drug as you have been told by your doctor or other health care provider, even if you feel well.  · Take this drug at the same time of day.  Oral-disintegrating tablet/tablet for oral suspension:   · Do not swallow whole. Do not chew, break, or crush.  · Do not take chipped or broken tablets.  · Do not push the tablet out of the foil when opening. Use dry hands to take it from the foil. Place the tablet on your tongue and follow with a sip of liquid before you swallow. Swallow only after the tablet dissolves.  · Whole tablet(s) may also be mixed in a cup with a small amount of liquid like 1 tablespoon (15 mL) or enough to cover the drug. Let the tablet(s) dissolve all the way and drink the mixture right away. If any drug is left in the cup, rinse cup with a small amount of liquid, swirl, and drink.  Liquid:   · Measure liquid doses carefully. Use the measuring device that comes with this drug. If there is none, ask the pharmacist for a device to measure this drug.  · Do not use a household teaspoon or tablespoon to measure this drug. Doing so could lead to the dose being too high.  All tablet products:   · Swallow whole. Do not chew or crush.  Regular-release tablets:   · You may break the tablet in half. Do not chew or crush.  Extended-release tablets:   · Do not split or break tablet.  · Check your drug when you get a new prescription to make sure you have the right drug. Call your doctor right away if you think you were given the wrong drug or if you are not sure what your drug should look like.  Injection:   · It  is given as an infusion into a vein over a period of time.  What do I do if I miss a dose?   All oral products:   · Take a missed dose as soon as you think about it.  · If it is close to the time for your next dose, skip the missed dose and go back to your normal time.  · Do not take 2 doses at the same time or extra doses.  Injection:   · Call your doctor to find out what to do.  How do I store and/or throw out this drug?   All oral products:   · Store at room temperature in a dry place. Do not store in a bathroom.  · Protect from heat and light.  Injection:   · If you need to store this drug at home, talk with your doctor, nurse, or pharmacist about how to store it.  All products:   · Keep all drugs in a safe place. Keep all drugs out of the reach of children and pets.  · Throw away unused or  drugs. Do not flush down a toilet or pour down a drain unless you are told to do so. Check with your pharmacist if you have questions about the best way to throw out drugs. There may be drug take-back programs in your area.  General drug facts   · If your symptoms or health problems do not get better or if they become worse, call your doctor.  · Do not share your drugs with others and do not take anyone else's drugs.  · Some drugs may have another patient information leaflet. If you have any questions about this drug, please talk with your doctor, nurse, pharmacist, or other health care provider.  · This drug comes with an extra patient fact sheet called a Medication Guide. Read it with care. Read it again each time this drug is refilled. If you have any questions about this drug, please talk with the doctor, pharmacist, or other health care provider.  · If you think there has been an overdose, call your poison control center or get medical care right away. Be ready to tell or show what was taken, how much, and when it happened.    Consumer Information Use and Disclaimer   This generalized information is a limited  "summary of diagnosis, treatment, and/or medication information. It is not meant to be comprehensive and should be used as a tool to help the user understand and/or assess potential diagnostic and treatment options. It does NOT include all information about conditions, treatments, medications, side effects, or risks that may apply to a specific patient. It is not intended to be medical advice or a substitute for the medical advice, diagnosis, or treatment of a health care provider based on the health care provider's examination and assessment of a patient's specific and unique circumstances. Patients must speak with a health care provider for complete information about their health, medical questions, and treatment options, including any risks or benefits regarding use of medications. This information does not endorse any treatments or medications as safe, effective, or approved for treating a specific patient. UpToDate, Inc. and its affiliates disclaim any warranty or liability relating to this information or the use thereof. The use of this information is governed by the Terms of Use, available at https://www.[employer]/en/solutions/lexicomp/about/majo.  Last Reviewed Date   2021-02-08  Copyright   © 2021 UpToDate, Inc. and its affiliates and/or licensors. All rights reserved.  Patient Education       EEG   The Basics   Written by the doctors and editors at [employer]   What is an EEG? -- EEG is short for "electroencephalogram." An EEG is a test that measures electrical activity in the brain and records brain wave patterns. Some brain conditions can affect a person's brain wave patterns.   Why might my doctor order an EEG? -- Your doctor might order an EEG to diagnose a brain condition or get more information about a known brain condition. You might have an EEG if you:  · Have seizures - Seizures are waves of abnormal electrical activity in the brain. Seizures can make you have convulsions (sudden shaking " "episodes), pass out, or move or behave strangely. An EEG can give your doctor information about your seizures.  · Have a brain tumor, brain injury, sleep problems, or memory problems  · Are going to have brain surgery  A doctor might also order an EEG to check the brain activity of a person in a coma.  How do I prepare for an EEG? -- To prepare for an EEG, you should:  · Wash your hair the night before your EEG with shampoo only. Don't put any conditioner, cream, or gel in your hair.  · Not eat or drink anything with caffeine in it for 8 to 12 hours beforehand.  · Follow your doctor's instructions about taking your medicines. You might need to take your usual medicines and doses. Or you might need to change your medicines.  · Follow your doctor's instructions about sleep. You might need to stay awake the night before your EEG if your doctor orders something called a "sleep-deprived EEG." If you do this, you will need to stay awake without drinking coffee or energy drinks.  What happens during an EEG? -- An EEG can take place in a lab, office, or hospital.  At the start of the EEG, a technician will stick electrodes onto your scalp using paste (figure 1). Some people wear a special cap with electrodes on it instead of having electrodes put on their scalp. Wires run from the electrodes to a computer that will record your brain activity.  After the electrodes are in place, you will lie on a bed or lean back in a chair. Your doctor might give you medicine to make you sleepy. You will need to stay still and close your eyes. The technician will ask you to do different things at different times during the EEG. He or she might have you look at a flashing light, or breathe in and out deeply and quickly.  Having an EEG usually takes about an hour. Some EEGs last longer. People can have an EEG for a few hours or overnight. In some cases, a video camera will record them during their EEG. This is called a "video EEG."   Some " "people have an "ambulatory EEG." After the electrodes are in place, they go home wearing a portable EEG recorder. The EEG records their brain waves for a few days, or sometimes longer, while they do their usual activities.  What happens after an EEG? -- After an EEG, the technician will remove the electrodes or the cap. Most people can go about their usual activities. But if your doctor gave you medicine to make you sleepy, you will feel sleepy for a while. In that case, you should have another person drive you home.  What are the downsides of an EEG? -- There aren't usually any downsides to having an EEG. The procedure is painless. An EEG only gets information about the electricity that your brain makes. It does not do anything to your brain, and it cannot read your thoughts or feelings.  What should I know about the results? -- You should know that an EEG doesn't always give a doctor helpful information. That's because an EEG records brain activity only for a short time.  Some people can have a normal EEG result even if they have seizures or another brain condition. Your doctor will use your EEG results along with your exam and other results to diagnose or manage your condition. If your doctor thinks that another EEG would be helpful, he or she might have you do an EEG that lasts longer.  All topics are updated as new evidence becomes available and our peer review process is complete.  This topic retrieved from [employer] [facility] [employer] on: Sep 21, 2021.  Topic 41016 Version 6.0  Release: 29.4.2 - C29.263  © 2021 UpToDate, Inc. and/or its affiliates. All rights reserved.  figure 1: Person having an EEG     · This drawing shows a person having an EEG (also called an electroencephalogram). An EEG is a test that measures electrical activity in the brain and records brain wave patterns.  · The electrodes are stuck onto the scalp using paste. In some cases, the person wears a special cap with electrodes on it instead.  Graphic 70244 " "Version 6.0    Consumer Information Use and Disclaimer   This information is not specific medical advice and does not replace information you receive from your health care provider. This is only a brief summary of general information. It does NOT include all information about conditions, illnesses, injuries, tests, procedures, treatments, therapies, discharge instructions or life-style choices that may apply to you. You must talk with your health care provider for complete information about your health and treatment options. This information should not be used to decide whether or not to accept your health care provider's advice, instructions or recommendations. Only your health care provider has the knowledge and training to provide advice that is right for you. The use of this information is governed by the [employer] End User License Agreement, available at https://www.[employer]/en/solutions/navabi/about/majo.The use of [employer] content is governed by the [employer] Terms of Use. ©2021 UpToDate, Inc. All rights reserved.  Copyright   © 2021 UpToDate, Inc. and/or its affiliates. All rights reserved.  Patient Education       Seizures   The Basics   Written by the doctors and editors at [employer]   What are seizures? -- Seizures are waves of abnormal electrical activity in the brain. Seizures can make you pass out, or move or behave strangely. Most seizures last only a few seconds or minutes.  Epilepsy is a condition that causes people to have repeated seizures. But not everyone who has had a seizure has epilepsy. Problems such as low blood sugar or infection can also cause seizures. Other problems such as anxiety or fainting spells can cause events that look like seizures.  What are the symptoms of a seizure? -- There are different kinds of seizures. Each causes a different set of symptoms.  People who have "tonic clonic" or "grand mal" seizures often get stiff and then have jerking movements. People who have " "other types of seizures have less dramatic changes. For instance, some people have shaking movements in just 1 arm or in a part of their face. Other people suddenly stop responding and stare for a few seconds.  Should I see a doctor or nurse if I have a seizure? -- If you have never had a seizure before and you have one, you (or whoever is with you) should call for an ambulance (in the [place] and [place], dial [phone]). Having a seizure can be a sign that something is wrong with your brain.  How are seizures treated? -- The right treatment for seizures depends on what is causing them. If you have seizures because of an infection, you will probably need treatments to get rid of the infection. On the other hand, if you have repeated seizures because of epilepsy, you will probably need anti-seizure medicines, also called "anti-convulsants."  People sometimes need to try different medicines before they find a treatment that works well. Seizures can be hard to control. But if you work with your doctor, chances are good that you will find a treatment that works.  Do anti-seizure medicines cause side effects? -- Yes. Anti-seizure medicines can cause side effects. They can make you feel tired or clumsy, or cause other problems. If you are bothered by side effects, tell your doctor about it. They can work with you to find the medicine or dose that causes the fewest problems. Most of the side effects from these medicines are mild. But there are two side effects that are very serious but rare:  · Anti-seizure medicines can cause a rare but serious skin rash. Tell your doctor or nurse right away if you notice a new rash while taking an anti-seizure medicine.  · Anti-seizure medicines can increase the risk of becoming suicidal (wanting to kill yourself). Tell your doctor or nurse right away if you start to feel depressed or have thoughts of harming yourself.  What if anti-seizure medicines do not work for me? -- If you keep having " "seizures even after trying different medicines, you might have other options. Some people can have surgery to remove the small part of their brain that is causing seizures. Others get a device called a "vagus nerve stimulator" put in their chest to help control seizures.  A special diet, called the "ketogenic diet," might be helpful for some people. This diet involves eating foods that are high in fat but avoiding carbohydrates. If you are interested in trying this kind of diet, your doctor or nurse can talk to you about how to do this safely.   What can I do to keep myself safe? -- Until you have your seizures under control, do not drive. The laws that say when a person with seizures can drive are different depending on where the person lives. Ask your doctor if you can safely drive and about the laws where you live.  Also, if your seizures are not under control, make sure to take other safety steps. For example, do not swim without someone else nearby who could help you if you started having a seizure. And avoid activities that could result in you falling from a height.  How can I reduce my chances of having more seizures? -- You can:  · Take your medicines exactly as directed - at the right times, and at the right doses.  · Tell your doctor about any side effects you have. That way you can work together to find the best medicine for you.  · Be careful not to let your prescription run out. (Stopping anti-seizure medicine suddenly can put you at risk of seizures.)  · While on anti-seizure medicines, check with your doctor before starting any new medicines. Anti-seizure medicines can interact with prescription and non-prescription medicines, and with herbal drugs. Mixing them can increase side effects or make them not work as well.  · Avoid alcohol. Alcohol can increase the risk of seizures, affect the way seizure medicines work, and increase side effects from anti-seizure medicines.  What should other people do if " they see me having a seizure? -- Ask your doctor what your family members, friends, or coworkers should do if you have a seizure. Some people will have seizures from time to time, and they might not need to see a doctor every time. But if you have a seizure that lasts longer than 5 minutes or if you do not wake up after a seizure, someone should call for an ambulance (in the [place] and [place], dial [phone]).  Other people should not try to put anything in your mouth while you are having a seizure. But they should make sure you do not bang against any hard surfaces.  What if I want to get pregnant? -- If you take anti-seizure medicines, speak to your doctor or nurse before you start trying to get pregnant. Some anti-seizure medicines can hurt an unborn baby. You might need to switch medicines before you get pregnant.  All topics are updated as new evidence becomes available and our peer review process is complete.  This topic retrieved from [employer] on: Sep 21, 2021.  Topic 78147 Version 17.0  Release: 29.4.2 - C29.263  © 2021 UpToDate, Inc. and/or its affiliates. All rights reserved.  Consumer Information Use and Disclaimer   This information is not specific medical advice and does not replace information you receive from your health care provider. This is only a brief summary of general information. It does NOT include all information about conditions, illnesses, injuries, tests, procedures, treatments, therapies, discharge instructions or life-style choices that may apply to you. You must talk with your health care provider for complete information about your health and treatment options. This information should not be used to decide whether or not to accept your health care provider's advice, instructions or recommendations. Only your health care provider has the knowledge and training to provide advice that is right for you. The use of this information is governed by the [employer] End User License Agreement, available  "at https://www.[employer].com/en/solutions/lexicomp/about/majo.The use of [employer] content is governed by the [employer] Terms of Use. ©2021 UpToDate, Inc. All rights reserved.  Copyright   © 2021 UpToDate, Inc. and/or its affiliates. All rights reserved.  Patient Education       Epilepsy in Adults   The Basics   Written by the doctors and editors at [facility]   What is epilepsy? -- Epilepsy is a condition that causes people to have repeated seizures. Seizures are caused by abnormal electrical activity in the brain. Seizures can make you have convulsions, pass out, or move or behave strangely. Epilepsy can start at any age.  What are the symptoms of a seizure? -- There are different kinds of seizures. Each causes a different set of symptoms. Most seizures last only a few seconds or minutes.  People who have "tonic clonic" or "grand mal" seizures will suddenly pass out, get stiff, and then have jerking movements. Other types of seizures cause less dramatic symptoms. For instance, some people have shaking movements in just 1 arm or in a part of their face. Other people suddenly stop responding and stare for a few seconds.  Sometimes people can tell that they are about to have a seizure. They have a certain feeling or smell a certain smell. This feeling or smell is called an "aura."  Will I need tests? -- Yes. You will probably have 1 or both of the following:  · EEG - An EEG measures electrical activity in the brain (figure 1).  · CT or MRI scan - These tests create pictures of the brain.  How is epilepsy treated? -- Epilepsy is treated with anti-seizure medicines. These medicines can't cure epilepsy, but they can help prevent seizures. There are different anti-seizure medicines. The right one for you depends on your seizures and other factors.  Anti-seizure medicines usually work well to control seizures. But if they don't control your seizures, your doctor might talk with you about other possible treatments. These " "can include:  · Brain surgery  · A device called a "vagus nerve stimulator" that goes in your chest to help control seizures  What should I know about anti-seizure medicines? -- You should know that:  · These medicines can cause side effects. They can make you feel tired or dizzy, or cause other problems. Let your doctor know about any side effects you have. That way, together you can find the best medicine and dose for you.  There are some rare side effects that can be very serious. Let your doctor know right away if you get a new rash or feel like hurting yourself.  · Anti-seizure medicines can affect other medicines you take. Plus, other medicines can keep your anti-seizure medicine from working well. Let your doctor know if you start any new prescription or non-prescription medicines, including birth control pills. Anti-seizure medicines can keep birth control pills from working well. This can lead to an unplanned pregnancy.  · You might need regular blood tests to check the amount of anti-seizure medicine in your body.  Will I need anti-seizure medicine for the rest of my life? -- It depends on your seizures. If you don't have any seizures for several years, your doctor might talk with you about stopping your medicine. But don't ever stop your medicine on your own.  How can I lower my chances of having more seizures? -- To lower your chances of having more seizures, you can:  · Take your medicine exactly as directed  · Get enough sleep - Not getting enough sleep raises your chances of having a seizure.  · Avoid drinking alcohol or using drugs  Can I drive if I have epilepsy? -- Each state and country has its own rules. Before you can drive again, you will probably need to be seizure-free for a certain amount of time. You might also need to get your doctor's permission.  What if I want to get pregnant? -- Talk with your doctor before you start trying to get pregnant. They might change your anti-seizure medicine " or prescribe a vitamin called folic acid. But don't ever stop your medicine on your own.  When should I call my doctor or nurse? -- Your doctor will make a plan with you that tells you when to call them. In general, call your doctor or nurse if you have more seizures than usual or your seizures last longer than usual.  Some seizures are a medical emergency. If you have a seizure that lasts longer than 5 minutes, or if you have 2 or more seizures without waking up in between, the person with you should call for an ambulance (in the  and [place], dial [id number]).  What else should I do if I have epilepsy? -- You should:  · Wear a medical bracelet to let others know about your epilepsy.  · Let family members and friends know how to help you if you have a seizure. They can position you so that you will not hurt yourself, but they should not put anything in your mouth. They should time your seizure and call for an ambulance (in the  and [place], dial [id number]) if it lasts longer than 5 minutes.  All topics are updated as new evidence becomes available and our peer review process is complete.  This topic retrieved from [employer] on: Sep 21, 2021.  Topic 42661 Version 11.0  Release: 29.4.2 - C29.263  © 2021 UpToDate, Inc. and/or its affiliates. All rights reserved.  figure 1: Person having an EEG     · This drawing shows a person having an EEG (also called an electroencephalogram). An EEG is a test that measures electrical activity in the brain and records brain wave patterns.  · The electrodes are stuck onto the scalp using paste. In some cases, the person wears a special cap with electrodes on it instead.  Graphic 80121 Version 6.0    Consumer Information Use and Disclaimer   This information is not specific medical advice and does not replace information you receive from your health care provider. This is only a brief summary of general information. It does NOT include all information about conditions, illnesses, injuries,  tests, procedures, treatments, therapies, discharge instructions or life-style choices that may apply to you. You must talk with your health care provider for complete information about your health and treatment options. This information should not be used to decide whether or not to accept your health care provider's advice, instructions or recommendations. Only your health care provider has the knowledge and training to provide advice that is right for you. The use of this information is governed by the [employer] End User License Agreement, available at https://www.[employer]/en/solutions/[employer]/about/majo.The use of [employer] content is governed by the [employer] Terms of Use. ©2021 UpToDate, Inc. All rights reserved.  Copyright   © 2021 UpToDate, Inc. and/or its affiliates. All rights reserved.  Patient Education       Headache, Adult   About this topic   Headache is the word used to describe aching or pain in the head. There are many types of headaches. Some of them are:  · Headaches that are from an illness or injury. These may be caused from a virus or other infection. They can also happen when you do not get enough to drink.  · Tension headaches may cause mild to moderate pain. The pain may feel like it is squeezing, pressure, dull, or aching. You may have pain in the front, back, or both sides of head. Tension headaches are not often bad enough to keep you from doing daily activities. Some people may not feel like doing anything while they have the headache. Tension headaches can last from 30 minutes to 7 days.  · Migraine headaches may cause moderate to severe pain. The pain may throb on one or both sides of the head. These headaches often start off mild and get worse. You are often not able to do normal activities. This kind of headache may also have other signs with it like throwing up and not being able to be around light or sound.  · Cluster headaches are severe and happen again and again but for  short periods of time. The pain is burning, sharp, and keeps hurting. The pain may happen behind or around your eye. It can also be on one side of your face. Signs can include a stuffed, runny nose and red, watery eye on the side of pain. They can happen because of drinking alcohol, smoking, heat, and bright lights. Some drugs can also cause this type of headache.  · A sinus headache is often either a migraine or tension headache. Sinusitis should not cause repeat headaches. If you have pain over your nose or sinuses, a fever and thick liquid coming from your nose, you may have a sinus infection.  · Medication overuse headaches can happen if you have had many headaches and have taken headache medicine to help with them.  Not all headaches need to be checked by a doctor. Some kinds may be a sign of a serious problem. Care for headaches will depend on what is causing them.  What are the causes?   · The exact cause of many headaches is not known and may be different for each kind of headache.  What are the main signs?   Signs are different for each kind of headache. Talk with your doctor about your signs.  How does the doctor diagnose this health problem?   Your doctor will take your history and do an exam. You will be asked to talk about your headaches. The doctor will want to know when they happen and how often you get them. It may be hard to find the exact cause. Your doctor may order:  · Lab tests  · CT or MRI scan  · Other tests to check for health problems that could be causing the headaches  Your doctor may also suggest you keep a headache diary to try to see if there is a pattern to your headaches.  How does the doctor treat this health problem?   Goals are to stop or lower the number of headaches, help ease your pain, and give you a better quality of life. The type of care that is best for you will depend on the type of headache you have. This may be:  · Self-care during the headache:  ? Cold compresses or ice  "packs  ? Massaging temples and neck  ? Deep breathing techniques  ? Warm shower  ? Sleeping  ? Drugs that don't need to be ordered by a doctor like acetaminophen or ibuprofen  ? Avoiding possible triggers     · Other care may be:  ? Acupuncture  ? Physical therapy  ? Massage if the pain is in the neck and shoulders  ? Botox  What lifestyle changes are needed?   Some things can lessen the chance for you to get a headache.  · You can take drugs like acetaminophen, ibuprofen, or naproxen for pain as instructed, but use of these pain medicines should be limited. If you need to take pain medicines every day for headaches, call your doctor.  · If possible, lie down in a quiet, dark room.  · Make sure you eat at regular times. Do not skip meals. Drink plenty of fluids. Be sure you are getting enough sleep.  · If you have frequent headaches that interfere with your activities, you can keep a "headache diary." This might help to see if there is a pattern to your headaches. Make notes about:  ? Where your pain is on your head or neck.  ? When you have the pain and how long it lasts.  ? How your pain feels. Is it dull, sharp, burning, stabbing, or cramping?  ? What causes your pain?  ? What makes your pain better or worse?  What drugs may be needed?   Your doctor may order drugs based on the type of headache you have. The doctor may order drugs to:  · Help with pain  · Prevent or stop the headache  · Treat upset stomach and throwing up  · Treat high blood pressure  · Treat low mood  · Treat hormonal imbalance  What problems could happen?   Headache may be part of a more serious health problem.  What can be done to prevent this health problem?   · Know the things that may start your headache.  · Don't spend too much time in front of screens. This includes watching TV, using computers, and playing video games.  · Take drugs to keep from getting headaches. Your doctor may give you drugs to lower how long the headache lasts. This " can also help lower how long you will have your headache.  · Hold the phone rather than resting it on your shoulder, or use a headset.  · Maintain good posture and exercise regularly to keep back, shoulder, and neck muscles strong.  Where can I learn more?   American Academy of Family Physicians  http://familydoctor.org/familydoctor/en/diseases-conditions/headaches.html   National [place] of Neurological Disorders and Stroke  https://www.ninds.nih.gov/Disorders/All-Disorders/Headache-Information-Page   [facility] Choices  http://www.nhs.uk/conditions/headache/Pages/Introduction.aspx   Last Reviewed Date   2021-06-16  Consumer Information Use and Disclaimer   This information is not specific medical advice and does not replace information you receive from your health care provider. This is only a brief summary of general information. It does NOT include all information about conditions, illnesses, injuries, tests, procedures, treatments, therapies, discharge instructions or life-style choices that may apply to you. You must talk with your health care provider for complete information about your health and treatment options. This information should not be used to decide whether or not to accept your health care providers advice, instructions or recommendations. Only your health care provider has the knowledge and training to provide advice that is right for you.  Copyright   Copyright © 2021 UpToDate, Inc. and its affiliates and/or licensors. All rights reserved.  Patient Education       How to Keep Track of Your Headaches   About this topic   Some people find that certain things trigger their headaches. Keep track of your headaches, what you were doing, and what you ate. Then, you and your doctor can start to see areas where you might be able to make changes to have fewer headaches. You can also learn about what triggers your headaches this way. Talk to your doctor about:  · Any drugs you may need to take to treat your  headache  · What changes you can make to your activities or diet to help you sleep better  · When you need to call the doctor if you are worried about your headache     Last Reviewed Date   2021-02-11  Consumer Information Use and Disclaimer   This information is not specific medical advice and does not replace information you receive from your health care provider. This is only a brief summary of general information. It does NOT include all information about conditions, illnesses, injuries, tests, procedures, treatments, therapies, discharge instructions or life-style choices that may apply to you. You must talk with your health care provider for complete information about your health and treatment options. This information should not be used to decide whether or not to accept your health care providers advice, instructions or recommendations. Only your health care provider has the knowledge and training to provide advice that is right for you.  Copyright   Copyright © 2021 UpToDate, Inc. and its affiliates and/or licensors. All rights reserved.  Patient Education       Home Headache Remedies   About this topic   Headaches can be painful and can get in the way of your everyday activities. Besides over-the-counter drugs, there are many things that you can do at home that may help get rid of your headaches.  General   · Change your environment.  ? Be in a quiet, cool room, if possible. Dim the lights and avoid any noise.  ? Avoid strong odors such as smoke, perfume, and cleaning products.  ? Exercise. Any type of exercise, even just simple walking, can help reduce headaches.  ? Manage stress. Yoga, meditation, and stretching are some things you can do that may help you to relax.  · Take care of your body.  ? Lessen pressure on your head. Remove tight hats or headbands. Loosen ponytails.  ? Get plenty of rest. Try to get around 8 hours of sleep per night.  ? Avoid grinding your teeth. Wear a mouth guard if you grind  your teeth or clench your jaws in your sleep.  ? Stop smoking.  · Pay attention to what you eat and drink.  ? Drink plenty of water. Dehydration can cause headaches. Coconut and mineral water may be especially helpful.  ? Drink a little caffeine, such as a cup of coffee or tea. If you drink a lot of caffeine on a normal basis, beware of withdrawal headaches if you do not have any caffeine. Be sure to drink other fluids like water or juice that do not have caffeine in them.  ? Avoid alcohol. Specific triggers may be red wine and beer from a tap.  ? Figure out what triggers your headaches. Doing an elimination diet by getting rid of one thing at a time may help you figure out what triggers your headaches. Some people have headaches after they eat:  § Processed or aged foods, like cheeses, meats, hatch, sausage, or hot dogs  § Artificial sweeteners  § Foods with MSG or monosodium glutamate in it, such as soy sauce or meat tenderizer  § Pickled or fermented foods  § Chocolate  § Citrus fruits  § Coffee  § Nuts or beans  · You may also want to try treatments like these:  ? Place an ice pack or a bag of frozen peas wrapped in a towel on your forehead or the back of your neck. Never put the ice right on the skin. Do not leave the ice on more than 10 to 15 minutes at a time.  ? Try putting a heating pad on the back of your head or neck for no more than 20 minutes at a time. Never go to sleep with a heating pad on as this can cause burns. Heated packs that are put in the microwave for 30 seconds to a minute also work well. A hot shower or bath may also help.  ? Massage your scalp or have someone else massage your scalp or neck.  ? Use essential oils, such as lavender, peppermint, or lemon.  ? Try acupuncture or acupressure. Two popular spots have been found to help with headaches. The first one is the space between the base of your left thumb and index finger. Another one is on the inside of your forearm around 2 inches up  from the wrist.  ? Kinesio tape to the neck applied in a certain way may help decrease headaches.  ? Biofeedback can help you learn what muscles you need to relax to help a headache.  Where can I learn more?   [email or web address].org  https://familydoctor.org/condition/headaches/   Last Reviewed Date   2021-08-07  Consumer Information Use and Disclaimer   This information is not specific medical advice and does not replace information you receive from your health care provider. This is only a brief summary of general information. It does NOT include all information about conditions, illnesses, injuries, tests, procedures, treatments, therapies, discharge instructions or life-style choices that may apply to you. You must talk with your health care provider for complete information about your health and treatment options. This information should not be used to decide whether or not to accept your health care provider's advice, instructions or recommendations. Only your health care provider has the knowledge and training to provide advice that is right for you.  Copyright   Copyright © 2021 UpToDate, Inc. and its affiliates and/or licensors. All rights reserved.

## 2022-01-19 NOTE — PROGRESS NOTES
"Subjective:       Patient ID: Sandro Ojeda is a 32 y.o. female.    Chief Complaint: Seizures    HPI     The patient with Spastic Quadriplegic CP who is here to establish care.  Her main problem right now is related to muscle spasms. She is on Baclofen 10/20 mg BID,  mg BID and Elavil 50 mg for QHS for headaches which are well-controlled. No seizures. She is on OXC for Bipolar Disorder.    Interval History 11-5-2021: Patient established with Dr. Liu, new to me. Patient presents to appointment with caregiver. Patient's sister and mother were on the phone during appointment. History was provided by sister and mother. She is on Baclofen 20 mg TID,  mg TID and Elavil 75 mg for QHS for headaches which are well-controlled. She is on OXC for Bipolar Disorder. Family denies her having adverse effects from the medications.    Patient's sister states they made the appointment today because the patient had a "seizure" that occurred in August 2021 while hospitalized at the [facility] for aspiration. The patient's sister states that the "seizure" was not witnessed by family and not described to them. It was called a "mild seizure" to them by hospital staff. She states all the patient's home medications (including OXC and GBP) were stopped at that time. Per [facility] records, patient had "seizure disorder with big breakthrough seizure". The "seizure" was not described but it was noted that the patient's home meds were stopped at the time, and she was treated with IV Keppra, which was discontinued after PEG tube placement and resumption of home meds. According to the patient's sister, the patient was also experiencing a lot of anxiety while in the hospital because no visitors were allowed due to COVID. Patient's mother states patient had a "seizure" at age 6 on the bus on her way home from school. This episode was described by the  as her shaking and eyes rolling back as well as drooling. It lasted 5 to 10 minutes. " "This was her only episode prior to her hospitalization. She has been on Depakote in the past for migraines. Patient's father has a history of seizures. Patient has a history of CVA. No TBI.    Interval History 1-: Patient presents to appointment for "seizures". According to her caregiver, patient has had 2 episodes since her last visit in November. Patient's events occurred in December 2021 and on 1-4-2022. The first event was witnessed by the caregiver. She described the event as the patient's arms stiffening and eyes rolling back. She made funny sounds during the event and was unaware.  She bit her tongue and had urinary incontinence. The event lasted about 2-3 minutes, and she had postictal confusion and drowsiness for about an hour. The patient went to the [facility] for the event that occurred on 1-4-2022. The caregiver did not witness this event and was unsure of details. She was told that her arms stiffened and eyes rolled back for this event. Denies changes in medications or recent antibiotic use. The patient was taking  mg TID and OXC at this time. 11- OXC level 3L. 11- The EEG is normal in the awake state. 1-6-2022 MRI brain showed no acute abnormality. Unremarkable appearance of the hippocampal structures. Sphenoid sinus disease. Remaining nonspecific findings as discussed above and possibly related to patient's cerebral palsy.    Patient states her headaches returned within the last week. The headaches occur in the back of her head. She is currently taking elavil 75 mg QHS. Patient's caregiver states that she has not complained about a headache prior to this appointment. Patient has not taking anything OTC for headaches.           Review of Systems   Constitutional: Negative for appetite change and fatigue.   HENT: Negative for hearing loss and tinnitus.    Eyes: Negative for photophobia and visual disturbance.   Respiratory: Negative for apnea and shortness of breath.  "   Cardiovascular: Negative for chest pain and palpitations.   Gastrointestinal: Negative for nausea and vomiting.   Endocrine: Negative for cold intolerance and heat intolerance.   Genitourinary: Negative for difficulty urinating and urgency.   Musculoskeletal: Positive for arthralgias and gait problem. Negative for back pain, joint swelling, myalgias, neck pain and neck stiffness.   Skin: Negative for color change and rash.   Allergic/Immunologic: Negative for environmental allergies and immunocompromised state.   Neurological: Positive for seizures, speech difficulty, weakness and headaches. Negative for dizziness, tremors, syncope, facial asymmetry, light-headedness and numbness.   Hematological: Negative for adenopathy. Does not bruise/bleed easily.   Psychiatric/Behavioral: Positive for dysphoric mood. Negative for agitation, behavioral problems, confusion, decreased concentration, hallucinations, self-injury, sleep disturbance and suicidal ideas. The patient is nervous/anxious. The patient is not hyperactive.          Current Outpatient Medications:     amitriptyline (ELAVIL) 75 MG tablet, Take 1 tablet (75 mg total) by mouth every evening., Disp: 30 tablet, Rfl: 11    ARIPiprazole (ABILIFY) 400 mg sers syringe, Inject 400 mg into the muscle every 28 days., Disp: , Rfl:     baclofen (LIORESAL) 20 MG tablet, Take 1 tablet (20 mg total) by mouth 3 (three) times daily., Disp: 90 tablet, Rfl: 11    cetirizine (ZYRTEC) 10 MG tablet, Take 10 mg by mouth once daily., Disp: , Rfl:     gabapentin (NEURONTIN) 250 mg/5 mL solution, Take 5 mLs (250 mg total) by mouth 3 (three) times daily., Disp: 450 mL, Rfl: 11    magnesium oxide (MAG-OX) 400 mg (241.3 mg magnesium) tablet, Take 1 tablet (400 mg total) by mouth 2 (two) times daily., Disp: , Rfl: 0    metoprolol tartrate (LOPRESSOR) 25 MG tablet, Take 25 mg by mouth., Disp: , Rfl:     mometasone (NASONEX) 50 mcg/actuation nasal spray, 2 sprays by Nasal route.,  Disp: , Rfl:     OXcarbazepine (TRILEPTAL) 300 mg/5 mL (60 mg/mL) Susp, Take 5 mg by mouth 2 (two) times daily., Disp: , Rfl:     pantoprazole (PROTONIX) 40 MG tablet, Take 40 mg by mouth once daily., Disp: , Rfl:     sertraline (ZOLOFT) 100 MG tablet, Take 100 mg by mouth once daily., Disp: , Rfl:     tamsulosin (FLOMAX) 0.4 mg Cp24, Take 0.4 mg by mouth once daily., Disp: , Rfl:     diazePAM (VALIUM) 1 mg/mL oral solution, 5 mLs (5 mg total) by Per G Tube route once. Take 30 minutes prior to MRI for 1 dose, Disp: 5 mL, Rfl: 0    levETIRAcetam (KEPPRA) 500 MG Tab, Take 1 tablet (500 mg total) by mouth 2 (two) times daily. Per PEG tube. Dissolve pill in water prior to use., Disp: 60 tablet, Rfl: 11  Past Medical History:   Diagnosis Date    Bipolar disorder 7/25/2018    GERD (gastroesophageal reflux disease)     Headache(784.0)     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    Tension headache 7/25/2018     Past Surgical History:   Procedure Laterality Date    BACK SURGERY      EYE SURGERY      HIP SURGERY       Social History     Socioeconomic History    Marital status: Single   Tobacco Use    Smoking status: Never Smoker    Smokeless tobacco: Never Used   Substance and Sexual Activity    Alcohol use: No    Drug use: No    Sexual activity: Never       Objective:     Vital signs reviewed     GENERAL APPEARANCE:     The patient looks comfortable.    No signs of medical or psychiatric distress.    Normal breathing pattern.    No dysmorphic features    Normal eye contact.     GENERAL MEDICAL EXAM:    HEENT:  Head is atraumatic normocephalic. No tender temporal arteries.     Neck and Axillae: No JVD. No carotid bruits. No thyromegaly. No lymphadenopathy.    Cardiopulmonary: No cyanosis. No tachypnea. Normal respiratory effort.    Gastrointestinal:  No stomas or lesions. No hernias. PEG tube.    Skin, Hair and Nails: No pathognonomic skin rash. No neurofibromatosis.   No stigmata of autoimmune  disease.     Limbs: No varicose veins. No edema.    Muskoskeletal: Flexion deformities.No spine tenderness.   No signs of longstanding neuropathy. No dislocations or fractures.        Neurologic Exam     Mental Status   Oriented to person.   Oriented to place.   Disoriented to time. Disoriented to year, month and date.   Follows 2 step commands.   Attention: normal. Concentration: normal.   Speech: slurred   Level of consciousness: alert  Knowledge: good and consistent with education.   Able to name object. Able to repeat. Normal comprehension.     Spastic Dysarthria      Cranial Nerves     CN II   Visual acuity: decreased  Right visual field deficit: none    CN III, IV, VI   Right pupil: Size: 2 mm. Shape: regular. Consensual response: intact. Accommodation: intact.   Left pupil: Size: 2 mm. Shape: regular. Consensual response: intact. Accommodation: intact.   CN III: no CN III palsy  CN VI: right CN VI palsy  Nystagmus: none   Diplopia: right  Ophthalmoparesis: right abduction  Upgaze: normal  Downgaze: normal  Conjugate gaze: absent    CN V   Facial sensation intact.   Right facial sensation deficit: none  Left facial sensation deficit: none    CN VII   Right facial weakness: none  Left facial weakness: none    CN VIII   CN VIII normal.   Hearing: intact    CN IX, X   CN IX normal.   CN X normal.   Palate: symmetric    CN XI   CN XI normal.   Right sternocleidomastoid strength: weak  Left sternocleidomastoid strength: weak  Right trapezius strength: weak  Left trapezius strength: weak    CN XII   CN XII normal.   Tongue: not atrophic  Fasciculations: absent  Tongue deviation: none  Refused examination of pupils     Motor Exam   Muscle bulk: decreased  Overall muscle tone: increased  Right arm tone: spastic  Left arm tone: spastic  Right arm pronator drift: absent  Left arm pronator drift: absent  Right leg tone: spastic  Left leg tone: spastic    Strength   Right neck flexion: 4/5  Left neck flexion: 4/5  Right  neck extension: 4/5  Left neck extension: 4/5  Right deltoid: 4/5  Left deltoid: 4/5  Right biceps: 4/5  Left biceps: 4/5  Right triceps: 3/5  Left triceps: 3/5  Right wrist flexion: 2/5  Left wrist flexion: 5/5  Right wrist extension: 2/5  Left wrist extension: 2/5  Right interossei: 1/5  Left interossei: 1/5  Right iliopsoas: 4/5  Left iliopsoas: 4/5  Right quadriceps: 3/5  Left quadriceps: 3/5  Right hamstring: 3/5  Left hamstring: 3/5  Right glutei: 3/5  Left glutei: 3/5  Right anterior tibial: 2/5  Left anterior tibial: 2/5  Right posterior tibial: 2/5  Left posterior tibial: 2/5  Right peroneal: 1/5  Left peroneal: 1/5  Right gastroc: 1/5  Left gastroc: 1/5    Sensory Exam   Light touch normal.   Right arm light touch: normal  Left arm light touch: normal  Right leg light touch: normal  Left leg light touch: normal  Right arm vibration: decreased from fingers  Left arm vibration: decreased from fingers  Right leg vibration: decreased from toes  Left leg vibration: decreased from toes  Right arm proprioception: decreased from fingers  Left arm proprioception: decreased from fingers  Right leg proprioception: decreased from toes  Left leg proprioception: decreased from toes  Pinprick normal.   Right arm pinprick: normal  Left arm pinprick: normal  Right leg pinprick: normal  Left leg pinprick: normal    Gait, Coordination, and Reflexes     Gait  Gait: (WC-bound)    Coordination   Finger to nose coordination: abnormal  Heel to shin coordination: abnormal    Tremor   Resting tremor: absent  Intention tremor: absent  Action tremor: absent    Reflexes   Right brachioradialis: 3+  Left brachioradialis: 3+  Right biceps: 3+  Left biceps: 3+  Right triceps: 3+  Left triceps: 3+  Right patellar: 3+  Left patellar: 3+  Right achilles: 3+  Left achilles: 3+  Right plantar: upgoing  Left plantar: upgoing  Right Varela: present  Left Varela: present  Right ankle clonus: present  Left ankle clonus: present  Right pendular  knee jerk: present  Left pendular knee jerk: present      Lab Results   Component Value Date    WBC 3.89 (L) 11/12/2021    HGB 14.4 11/12/2021    HCT 44.7 11/12/2021     (H) 11/12/2021     11/12/2021     Sodium   Date Value Ref Range Status   11/12/2021 138 136 - 145 mmol/L Final     Potassium   Date Value Ref Range Status   11/12/2021 4.6 3.5 - 5.1 mmol/L Final     Chloride   Date Value Ref Range Status   11/12/2021 102 95 - 110 mmol/L Final     CO2   Date Value Ref Range Status   11/12/2021 25 23 - 29 mmol/L Final     Glucose   Date Value Ref Range Status   11/12/2021 76 70 - 110 mg/dL Final     BUN   Date Value Ref Range Status   11/12/2021 7 6 - 20 mg/dL Final     Creatinine   Date Value Ref Range Status   11/12/2021 0.5 0.5 - 1.4 mg/dL Final     Calcium   Date Value Ref Range Status   11/12/2021 10.1 8.7 - 10.5 mg/dL Final     Total Protein   Date Value Ref Range Status   11/12/2021 7.8 6.0 - 8.4 g/dL Final     Albumin   Date Value Ref Range Status   11/12/2021 3.8 3.5 - 5.2 g/dL Final     Total Bilirubin   Date Value Ref Range Status   11/12/2021 0.4 0.1 - 1.0 mg/dL Final     Comment:     For infants and newborns, interpretation of results should be based  on gestational age, weight and in agreement with clinical  observations.    Premature Infant recommended reference ranges:  Up to 24 hours.............<8.0 mg/dL  Up to 48 hours............<12.0 mg/dL  3-5 days..................<15.0 mg/dL  6-29 days.................<15.0 mg/dL       Alkaline Phosphatase   Date Value Ref Range Status   11/12/2021 63 55 - 135 U/L Final     AST   Date Value Ref Range Status   11/12/2021 23 10 - 40 U/L Final     ALT   Date Value Ref Range Status   11/12/2021 32 10 - 44 U/L Final     Anion Gap   Date Value Ref Range Status   11/12/2021 11 8 - 16 mmol/L Final     eGFR if    Date Value Ref Range Status   11/12/2021 >60.0 >60 mL/min/1.73 m^2 Final     eGFR if non    Date Value Ref Range  Status   11/12/2021 >60.0 >60 mL/min/1.73 m^2 Final     Comment:     Calculation used to obtain the estimated glomerular filtration  rate (eGFR) is the CKD-EPI equation.        Lab Results   Component Value Date    DJZUMVAN10 434 12/11/2018     Lab Results   Component Value Date    TSH 0.995 12/11/2018     10-    OXC level 25 NL    11-    OXC level 3L    11-    The EEG is normal in the awake state.     1-6-2022     MRI brain showed no acute abnormality. Unremarkable appearance of the hippocampal structures. Sphenoid sinus disease. Remaining nonspecific findings as discussed above and possibly related to patient's cerebral palsy.    02- THROUGH 02-     AEEG 85 Hours (INTERPRETED 02-)     PGE, SSW, Generalized     Primary Generalized Epilepsy       Assessment:       1. Seizure disorder    2. Seizure-like activity    3. CVA, old, hemiparesis    4. Tension headache    5. Spastic quadriplegic cerebral palsy        EPILEPSY CLASSIFICATION        SEMIOLOGY: TONIC SEIZURE     EPILEPTOGENIC ZONE (S): UNKNOWN     ETIOLOGY: UNKNOWN      PRIOR AEDS:  VPA (for headaches)    CURRENT AEDS: OXC (for bipolar), GBP (for headaches)     LAST SEIZURE DATE: 1-4-2022        COMPREHENSIVE LIST OF AEDs:      Acetazolamide (AZM-Diamox)   Benzos: clonazepam (CZP Klonopin), lorazepam (LZP-Ativan), diazepam (DZP-Valium), clorazepate (CLZ- Tranxene)  Brivaracetam (BRV-Briviact)  Cannabidiol (CBD- Epidiolex)  Carbamazepine (CBZ-Tegretol)  Cenobamate (CNB-Xcopri)  Clobazam (CLB-Onfi)  Eslicarbazepine (ESL-Aptiom)  Ethosuximide (ESX-Zarontin)  Felbamate (FBM-Felbatol)  Gabapentin (GBP-Neurontin)  Lacosamide (LCM-Vimpat)  Lamotrigine (LTG-Lamitcal)  Levetiracetam (LEV- Keppra)  Oxcarbazepine (OXC-Trileptal)  Perampanel (PML-Fycompa)  Phenobarbital (PB)  Phenytoin (PHT-Dilantin)  Pregabalin (PGB-Lyrica)  Primidone (PRM)   Retigabine (RTG- Potiga) Discontinued in 2017  Rufinamide (RFN-Benzil)  Stiripentol  (STP-Diacomit)  Tiagabine (TGB-Gabitril)  Topiramate (TPM-Topamax)  Valproate (VPA-Depakote)  Vigabatrin (VGB-Sabril)  Zonisamide (ZNS-Zonegran)      Plan:           SPASIC QUADRIPARETIC CP  WITH MUSCLE SPASMS AND TENSION HEADACHE    Continue Baclofen to 20 mg TID    Continue GBP to 250 mg TID    Continue Elavil to 75 mg QHS    Trying taking OTC ibuprofen or tylenol for headaches. Do not exceed 3 doses within 1 week.      SEIZURE DISORDER    [facility] records requested    Evaluate  AEEG    Start  mg BID and check level in 6 weeks    Continue  mg TID    Continue  mg BID    The patient was encouraged to maintain full traditional seizure precautions which include but not limited to avoid high altitudes, avoid being close to fire or fire source, avoid being close to a body of water or swimming alone, avoid operating heavy machinery and avoid using sharp objects if possible. The patient was encouraged to shower (without accumulation of water) instead of taking a bath if unsupervised. The patient was made aware that these precautions are especially important during concurrent illness. Adequate sleep and avoidance of alcohol as important measures to assure good seizure control were discussed with the patient. The patient was also advised not to care for children without company. The patient was advised to pad the side rails with pillows and blankets if applicable.I strongly recommended lowering the bed to the floor level to decrease the risk of falls.       Continue AEDs INDEFINITELY, FOR GOOD AND NEVER SKIP A DOSE. The patient verbalized full understanding. Stressed the extreme medical, personal safety, public safety and legal importance of compliance and seizure control. Will give as many refills as possible with or without face-to-face evaluation to make sure the patient and any patient with epilepsy will never run out of medications. Running out of seizure medications should never happen under our care.  I explained to the patient that he should not, under any circumstances, adjust or stop taking his seizure medication without discussing with me. The patient verbalized full understanding.       Explained to the patient that if 2 AEDs fail to control the seizures the likelihood of AEDs alone to control the seizures is <10% and other other options should be pursued.          AVOID any substance that could lower seizure threshold including but not limited to:      ALCOHOL AND WITHDRAWAL      TRAMADOL.     DEMEROL      ALL STIMULANTS-ALL ADHD MEDICATIONS.      CLOZAPINE.      BUPROPION.     CIPROFLOXACIN          SEIZURE FREEDOM DEFINITION: No seizures for 1 year or for 3 times the interval between the last 2 seizures whichever is longer (Some studies suggest 6 times even)!                 MEDICAL/SURGICAL COMORBIDITIES     All relevant medical comorbidities noted and managed by primary care physician and medical care team.        I spent a total of 40 minutes on the day of the visit.  This includes face to face time and non-face to face time preparing to see the patient (eg, review of tests), obtaining and/or reviewing separately obtained history, documenting clinical information in the electronic or other health record, independently interpreting results and communicating results to the patient/family/caregiver, or care coordinator.      RTC  6 weeks      Bess Barrow, MSN, NP    Collaborating Provider: Robert Liu MD, FAAN Neurologist/Epileptologist

## 2022-02-03 PROCEDURE — 95726 PR EEG, W/VIDEO, CONT RECORD, CMPLT STDY, I&R, >84 HRS: ICD-10-PCS | Mod: ,,, | Performed by: PSYCHIATRY & NEUROLOGY

## 2022-02-03 PROCEDURE — 95726 EEG PHY/QHP>84 HR W/VEEG: CPT | Mod: ,,, | Performed by: PSYCHIATRY & NEUROLOGY

## 2022-02-15 ENCOUNTER — TELEPHONE (OUTPATIENT)
Dept: NEUROLOGY | Facility: CLINIC | Age: 32
End: 2022-02-15
Payer: MEDICAID

## 2022-02-15 NOTE — TELEPHONE ENCOUNTER
----- Message from Robert Liu MD sent at 2/15/2022  2:49 PM CST -----    02- THROUGH 02-     AEEG 85 Hours (INTERPRETED 02-)    YANIV BURT, Generalized     Primary Generalized Epilepsy

## 2022-02-15 NOTE — TELEPHONE ENCOUNTER
Advised pt sister of AEEG results . She wanted to know if any changes would be made to regiment . Advised sisters as long as seizures or maintained we want make any changes . She verbalized understanding .

## 2022-03-18 ENCOUNTER — LAB VISIT (OUTPATIENT)
Dept: LAB | Facility: HOSPITAL | Age: 32
End: 2022-03-18
Attending: NURSE PRACTITIONER
Payer: MEDICAID

## 2022-03-18 ENCOUNTER — OFFICE VISIT (OUTPATIENT)
Dept: NEUROLOGY | Facility: CLINIC | Age: 32
End: 2022-03-18
Payer: MEDICAID

## 2022-03-18 VITALS
OXYGEN SATURATION: 84 % | BODY MASS INDEX: 23.9 KG/M2 | WEIGHT: 140 LBS | RESPIRATION RATE: 14 BRPM | DIASTOLIC BLOOD PRESSURE: 86 MMHG | HEIGHT: 64 IN | SYSTOLIC BLOOD PRESSURE: 124 MMHG | HEART RATE: 71 BPM

## 2022-03-18 DIAGNOSIS — G40.309 GENERALIZED EPILEPSY: ICD-10-CM

## 2022-03-18 DIAGNOSIS — R29.90 MULTIPLE NEUROLOGICAL SYMPTOMS: Primary | ICD-10-CM

## 2022-03-18 DIAGNOSIS — G43.709 CHRONIC MIGRAINE W/O AURA W/O STATUS MIGRAINOSUS, NOT INTRACTABLE: ICD-10-CM

## 2022-03-18 DIAGNOSIS — G40.909 SEIZURE DISORDER: ICD-10-CM

## 2022-03-18 DIAGNOSIS — G80.0 SPASTIC QUADRIPLEGIC CEREBRAL PALSY: Chronic | ICD-10-CM

## 2022-03-18 DIAGNOSIS — M62.838 MUSCLE SPASMS OF BOTH LOWER EXTREMITIES: ICD-10-CM

## 2022-03-18 DIAGNOSIS — F31.75 BIPOLAR DISORDER, IN PARTIAL REMISSION, MOST RECENT EPISODE DEPRESSED: Chronic | ICD-10-CM

## 2022-03-18 DIAGNOSIS — I69.359 CVA, OLD, HEMIPARESIS: ICD-10-CM

## 2022-03-18 DIAGNOSIS — G44.209 TENSION HEADACHE: ICD-10-CM

## 2022-03-18 PROCEDURE — 99214 OFFICE O/P EST MOD 30 MIN: CPT | Mod: S$PBB,,, | Performed by: NURSE PRACTITIONER

## 2022-03-18 PROCEDURE — 80177 DRUG SCRN QUAN LEVETIRACETAM: CPT | Performed by: NURSE PRACTITIONER

## 2022-03-18 PROCEDURE — 3008F PR BODY MASS INDEX (BMI) DOCUMENTED: ICD-10-PCS | Mod: CPTII,,, | Performed by: NURSE PRACTITIONER

## 2022-03-18 PROCEDURE — 3079F PR MOST RECENT DIASTOLIC BLOOD PRESSURE 80-89 MM HG: ICD-10-PCS | Mod: CPTII,,, | Performed by: NURSE PRACTITIONER

## 2022-03-18 PROCEDURE — 99215 OFFICE O/P EST HI 40 MIN: CPT | Mod: PBBFAC | Performed by: NURSE PRACTITIONER

## 2022-03-18 PROCEDURE — 1159F MED LIST DOCD IN RCRD: CPT | Mod: CPTII,,, | Performed by: NURSE PRACTITIONER

## 2022-03-18 PROCEDURE — 1159F PR MEDICATION LIST DOCUMENTED IN MEDICAL RECORD: ICD-10-PCS | Mod: CPTII,,, | Performed by: NURSE PRACTITIONER

## 2022-03-18 PROCEDURE — 99999 PR PBB SHADOW E&M-EST. PATIENT-LVL V: ICD-10-PCS | Mod: PBBFAC,,, | Performed by: NURSE PRACTITIONER

## 2022-03-18 PROCEDURE — 3074F SYST BP LT 130 MM HG: CPT | Mod: CPTII,,, | Performed by: NURSE PRACTITIONER

## 2022-03-18 PROCEDURE — 1160F RVW MEDS BY RX/DR IN RCRD: CPT | Mod: CPTII,,, | Performed by: NURSE PRACTITIONER

## 2022-03-18 PROCEDURE — 99214 PR OFFICE/OUTPT VISIT, EST, LEVL IV, 30-39 MIN: ICD-10-PCS | Mod: S$PBB,,, | Performed by: NURSE PRACTITIONER

## 2022-03-18 PROCEDURE — 99999 PR PBB SHADOW E&M-EST. PATIENT-LVL V: CPT | Mod: PBBFAC,,, | Performed by: NURSE PRACTITIONER

## 2022-03-18 PROCEDURE — 3074F PR MOST RECENT SYSTOLIC BLOOD PRESSURE < 130 MM HG: ICD-10-PCS | Mod: CPTII,,, | Performed by: NURSE PRACTITIONER

## 2022-03-18 PROCEDURE — 36415 COLL VENOUS BLD VENIPUNCTURE: CPT | Performed by: NURSE PRACTITIONER

## 2022-03-18 PROCEDURE — 3079F DIAST BP 80-89 MM HG: CPT | Mod: CPTII,,, | Performed by: NURSE PRACTITIONER

## 2022-03-18 PROCEDURE — 3008F BODY MASS INDEX DOCD: CPT | Mod: CPTII,,, | Performed by: NURSE PRACTITIONER

## 2022-03-18 PROCEDURE — 1160F PR REVIEW ALL MEDS BY PRESCRIBER/CLIN PHARMACIST DOCUMENTED: ICD-10-PCS | Mod: CPTII,,, | Performed by: NURSE PRACTITIONER

## 2022-03-18 RX ORDER — AMITRIPTYLINE HYDROCHLORIDE 100 MG/1
100 TABLET ORAL NIGHTLY
Qty: 30 TABLET | Refills: 11 | Status: SHIPPED | OUTPATIENT
Start: 2022-03-18

## 2022-03-18 NOTE — PROGRESS NOTES
"Subjective:       Patient ID: Sandro Ojeda is a 32 y.o. female.    Chief Complaint: Seizure disorder    HPI     BACKGROUND    The patient with Spastic Quadriplegic CP who is here to establish care.  Her main problem right now is related to muscle spasms. She is on Baclofen 10/20 mg BID,  mg BID and Elavil 50 mg for QHS for headaches which are well-controlled. No seizures. She is on OXC for Bipolar Disorder.    Interval History 11-5-2021: Patient established with Dr. Liu, new to me. Patient presents to appointment with caregiver. Patient's sister and mother were on the phone during appointment. History was provided by sister and mother. She is on Baclofen 20 mg TID,  mg TID and Elavil 75 mg for QHS for headaches which are well-controlled. She is on OXC for Bipolar Disorder. Family denies her having adverse effects from the medications.    Patient's sister states they made the appointment today because the patient had a "seizure" that occurred in August 2021 while hospitalized at the [facility] for aspiration. The patient's sister states that the "seizure" was not witnessed by family and not described to them. It was called a "mild seizure" to them by hospital staff. She states all the patient's home medications (including OXC and GBP) were stopped at that time. Per [facility] records, patient had "seizure disorder with big breakthrough seizure". The "seizure" was not described but it was noted that the patient's home meds were stopped at the time, and she was treated with IV Keppra, which was discontinued after PEG tube placement and resumption of home meds. According to the patient's sister, the patient was also experiencing a lot of anxiety while in the hospital because no visitors were allowed due to COVID. Patient's mother states patient had a "seizure" at age 6 on the bus on her way home from school. This episode was described by the  as her shaking and eyes rolling back as well as drooling. It " "lasted 5 to 10 minutes. This was her only episode prior to her hospitalization. She has been on Depakote in the past for migraines. Patient's father has a history of seizures. Patient has a history of CVA. No TBI.    Interval History 1-: Patient presents to appointment for "seizures". According to her caregiver, patient has had 2 episodes since her last visit in November. Patient's events occurred in December 2021 and on 1-4-2022. The first event was witnessed by the caregiver. She described the event as the patient's arms stiffening and eyes rolling back. She made funny sounds during the event and was unaware.  She bit her tongue and had urinary incontinence. The event lasted about 2-3 minutes, and she had postictal confusion and drowsiness for about an hour. The patient went to the [facility] for the event that occurred on 1-4-2022. The caregiver did not witness this event and was unsure of details. She was told that her arms stiffened and eyes rolled back for this event. Denies changes in medications or recent antibiotic use. The patient was taking  mg TID and OXC at this time. 11- OXC level 3L. 11- The EEG is normal in the awake state. 1-6-2022 MRI brain showed no acute abnormality. Unremarkable appearance of the hippocampal structures. Sphenoid sinus disease. Remaining nonspecific findings as discussed above and possibly related to patient's cerebral palsy.    Patient states her headaches returned within the last week. The headaches occur in the back of her head. She is currently taking elavil 75 mg QHS. Patient's caregiver states that she has not complained about a headache prior to this appointment. Patient has not taking anything OTC for headaches.    Interval History 3-: Patient presents for follow-up with caregiver. Caregiver states she does not care for patient often so she knows limited information. Patient is currently taking  mg BID for seizures without adverse " effects. She continues to  mg TID and  mg BID. She is complaint with her medications. Caregiver denies patient having recent seizures stating last seizure was in 1-4-2022. 02- THROUGH 02- AEEG 85 Hours (INTERPRETED 02-) PGE, SSW, Generalized. Primary generalized epilepsy.     Patient continues to complain of headaches stating amitriptyline 75 mg QHS and GBP are no longer helping. Caregiver states she is unaware of patient complaining of headaches at home. It is unclear how often patient is having headaches or if she is taking anything OTC for headaches.          Review of Systems   Constitutional: Negative for appetite change and fatigue.   HENT: Negative for hearing loss and tinnitus.    Eyes: Negative for photophobia and visual disturbance.   Respiratory: Negative for apnea and shortness of breath.    Cardiovascular: Negative for chest pain and palpitations.   Gastrointestinal: Negative for nausea and vomiting.   Endocrine: Negative for cold intolerance and heat intolerance.   Genitourinary: Negative for difficulty urinating and urgency.   Musculoskeletal: Positive for arthralgias and gait problem. Negative for back pain, joint swelling, myalgias, neck pain and neck stiffness.   Skin: Negative for color change and rash.   Allergic/Immunologic: Negative for environmental allergies and immunocompromised state.   Neurological: Positive for seizures, speech difficulty, weakness and headaches. Negative for dizziness, tremors, syncope, facial asymmetry, light-headedness and numbness.   Hematological: Negative for adenopathy. Does not bruise/bleed easily.   Psychiatric/Behavioral: Positive for dysphoric mood. Negative for agitation, behavioral problems, confusion, decreased concentration, hallucinations, self-injury, sleep disturbance and suicidal ideas. The patient is nervous/anxious. The patient is not hyperactive.          Current Outpatient Medications:     ARIPiprazole (ABILIFY)  400 mg sers syringe, Inject 400 mg into the muscle every 28 days., Disp: , Rfl:     baclofen (LIORESAL) 20 MG tablet, Take 1 tablet (20 mg total) by mouth 3 (three) times daily., Disp: 90 tablet, Rfl: 11    cetirizine (ZYRTEC) 10 MG tablet, Take 10 mg by mouth once daily., Disp: , Rfl:     gabapentin (NEURONTIN) 250 mg/5 mL solution, Take 5 mLs (250 mg total) by mouth 3 (three) times daily., Disp: 450 mL, Rfl: 11    levETIRAcetam (KEPPRA) 500 MG Tab, Take 1 tablet (500 mg total) by mouth 2 (two) times daily. Per PEG tube. Dissolve pill in water prior to use., Disp: 60 tablet, Rfl: 11    magnesium oxide (MAG-OX) 400 mg (241.3 mg magnesium) tablet, Take 1 tablet (400 mg total) by mouth 2 (two) times daily., Disp: , Rfl: 0    metoprolol tartrate (LOPRESSOR) 25 MG tablet, Take 25 mg by mouth., Disp: , Rfl:     mometasone (NASONEX) 50 mcg/actuation nasal spray, 2 sprays by Nasal route., Disp: , Rfl:     OXcarbazepine (TRILEPTAL) 300 mg/5 mL (60 mg/mL) Susp, Take 5 mg by mouth 2 (two) times daily., Disp: , Rfl:     pantoprazole (PROTONIX) 40 MG tablet, Take 40 mg by mouth once daily., Disp: , Rfl:     sertraline (ZOLOFT) 100 MG tablet, Take 100 mg by mouth once daily., Disp: , Rfl:     tamsulosin (FLOMAX) 0.4 mg Cp24, Take 0.4 mg by mouth once daily., Disp: , Rfl:     amitriptyline (ELAVIL) 100 MG tablet, Take 1 tablet (100 mg total) by mouth every evening., Disp: 30 tablet, Rfl: 11    diazePAM (VALIUM) 1 mg/mL oral solution, 5 mLs (5 mg total) by Per G Tube route once. Take 30 minutes prior to MRI for 1 dose, Disp: 5 mL, Rfl: 0  Past Medical History:   Diagnosis Date    Bipolar disorder 7/25/2018    GERD (gastroesophageal reflux disease)     Headache(784.0)     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    Tension headache 7/25/2018     Past Surgical History:   Procedure Laterality Date    BACK SURGERY      EYE SURGERY      HIP SURGERY       Social History     Socioeconomic History    Marital  status: Single   Tobacco Use    Smoking status: Never Smoker    Smokeless tobacco: Never Used   Substance and Sexual Activity    Alcohol use: No    Drug use: No    Sexual activity: Never       Objective:     Vital signs reviewed     GENERAL APPEARANCE:     The patient looks comfortable.    No signs of medical or psychiatric distress.    Normal breathing pattern.    No dysmorphic features    Normal eye contact.     GENERAL MEDICAL EXAM:    HEENT:  Head is atraumatic normocephalic. No tender temporal arteries.     Neck and Axillae: No JVD. No carotid bruits. No thyromegaly. No lymphadenopathy.    Cardiopulmonary: No cyanosis. No tachypnea. Normal respiratory effort.    Gastrointestinal:  No stomas or lesions. No hernias. PEG tube.    Skin, Hair and Nails: No pathognonomic skin rash. No neurofibromatosis.   No stigmata of autoimmune disease.     Limbs: No varicose veins. No edema.    Muskoskeletal: Flexion deformities.No spine tenderness.   No signs of longstanding neuropathy. No dislocations or fractures.        Neurologic Exam     Mental Status   Oriented to person.   Oriented to place.   Disoriented to time. Disoriented to year, month and date.   Follows 2 step commands.   Attention: normal. Concentration: normal.   Speech: slurred   Level of consciousness: alert  Knowledge: good and consistent with education.   Able to name object. Able to repeat. Normal comprehension.     Spastic Dysarthria      Cranial Nerves     CN II   Visual acuity: decreased  Right visual field deficit: none  Left visual field deficit: none     CN III, IV, VI   Right pupil: Size: 2 mm. Shape: regular. Consensual response: intact. Accommodation: intact.   Left pupil: Size: 2 mm. Shape: regular. Consensual response: intact. Accommodation: intact.   CN III: no CN III palsy  CN VI: right CN VI palsy  Nystagmus: none   Diplopia: right  Ophthalmoparesis: right abduction  Upgaze: normal  Downgaze: normal  Conjugate gaze: absent    CN V   Facial  sensation intact.   Right facial sensation deficit: none  Left facial sensation deficit: none    CN VII   Right facial weakness: none  Left facial weakness: none    CN VIII   CN VIII normal.   Hearing: intact    CN IX, X   CN IX normal.   CN X normal.   Palate: symmetric    CN XI   CN XI normal.   Right sternocleidomastoid strength: weak  Left sternocleidomastoid strength: weak  Right trapezius strength: weak  Left trapezius strength: weak    CN XII   CN XII normal.   Tongue: not atrophic  Fasciculations: absent  Tongue deviation: none  Refused examination of pupils     Motor Exam   Muscle bulk: decreased  Overall muscle tone: increased  Right arm tone: spastic  Left arm tone: spastic  Right arm pronator drift: absent  Left arm pronator drift: absent  Right leg tone: spastic  Left leg tone: spastic    Strength   Right neck flexion: 4/5  Left neck flexion: 4/5  Right neck extension: 4/5  Left neck extension: 4/5  Right deltoid: 4/5  Left deltoid: 4/5  Right biceps: 4/5  Left biceps: 4/5  Right triceps: 3/5  Left triceps: 3/5  Right wrist flexion: 2/5  Left wrist flexion: 5/5  Right wrist extension: 2/5  Left wrist extension: 2/5  Right interossei: 1/5  Left interossei: 1/5  Right iliopsoas: 4/5  Left iliopsoas: 4/5  Right quadriceps: 3/5  Left quadriceps: 3/5  Right hamstring: 3/5  Left hamstring: 3/5  Right glutei: 3/5  Left glutei: 3/5  Right anterior tibial: 2/5  Left anterior tibial: 2/5  Right posterior tibial: 2/5  Left posterior tibial: 2/5  Right peroneal: 1/5  Left peroneal: 1/5  Right gastroc: 1/5  Left gastroc: 1/5    Sensory Exam   Light touch normal.   Right arm light touch: normal  Left arm light touch: normal  Right leg light touch: normal  Left leg light touch: normal  Right arm vibration: decreased from fingers  Left arm vibration: decreased from fingers  Right leg vibration: decreased from toes  Left leg vibration: decreased from toes  Right arm proprioception: decreased from fingers  Left arm  proprioception: decreased from fingers  Right leg proprioception: decreased from toes  Left leg proprioception: decreased from toes  Pinprick normal.   Right arm pinprick: normal  Left arm pinprick: normal  Right leg pinprick: normal  Left leg pinprick: normal    Gait, Coordination, and Reflexes     Gait  Gait: (WC-bound)    Coordination   Finger to nose coordination: abnormal  Heel to shin coordination: abnormal    Tremor   Resting tremor: absent  Intention tremor: absent  Action tremor: absent    Reflexes   Right brachioradialis: 3+  Left brachioradialis: 3+  Right biceps: 3+  Left biceps: 3+  Right triceps: 3+  Left triceps: 3+  Right patellar: 3+  Left patellar: 3+  Right achilles: 3+  Left achilles: 3+  Right plantar: upgoing  Left plantar: upgoing  Right Varela: present  Left Varela: present  Right ankle clonus: present  Left ankle clonus: present  Right pendular knee jerk: present  Left pendular knee jerk: present      Lab Results   Component Value Date    WBC 3.89 (L) 11/12/2021    HGB 14.4 11/12/2021    HCT 44.7 11/12/2021     (H) 11/12/2021     11/12/2021     Sodium   Date Value Ref Range Status   11/12/2021 138 136 - 145 mmol/L Final     Potassium   Date Value Ref Range Status   11/12/2021 4.6 3.5 - 5.1 mmol/L Final     Chloride   Date Value Ref Range Status   11/12/2021 102 95 - 110 mmol/L Final     CO2   Date Value Ref Range Status   11/12/2021 25 23 - 29 mmol/L Final     Glucose   Date Value Ref Range Status   11/12/2021 76 70 - 110 mg/dL Final     BUN   Date Value Ref Range Status   11/12/2021 7 6 - 20 mg/dL Final     Creatinine   Date Value Ref Range Status   11/12/2021 0.5 0.5 - 1.4 mg/dL Final     Calcium   Date Value Ref Range Status   11/12/2021 10.1 8.7 - 10.5 mg/dL Final     Total Protein   Date Value Ref Range Status   11/12/2021 7.8 6.0 - 8.4 g/dL Final     Albumin   Date Value Ref Range Status   11/12/2021 3.8 3.5 - 5.2 g/dL Final     Total Bilirubin   Date Value Ref Range  Status   11/12/2021 0.4 0.1 - 1.0 mg/dL Final     Comment:     For infants and newborns, interpretation of results should be based  on gestational age, weight and in agreement with clinical  observations.    Premature Infant recommended reference ranges:  Up to 24 hours.............<8.0 mg/dL  Up to 48 hours............<12.0 mg/dL  3-5 days..................<15.0 mg/dL  6-29 days.................<15.0 mg/dL       Alkaline Phosphatase   Date Value Ref Range Status   11/12/2021 63 55 - 135 U/L Final     AST   Date Value Ref Range Status   11/12/2021 23 10 - 40 U/L Final     ALT   Date Value Ref Range Status   11/12/2021 32 10 - 44 U/L Final     Anion Gap   Date Value Ref Range Status   11/12/2021 11 8 - 16 mmol/L Final     eGFR if    Date Value Ref Range Status   11/12/2021 >60.0 >60 mL/min/1.73 m^2 Final     eGFR if non    Date Value Ref Range Status   11/12/2021 >60.0 >60 mL/min/1.73 m^2 Final     Comment:     Calculation used to obtain the estimated glomerular filtration  rate (eGFR) is the CKD-EPI equation.        Lab Results   Component Value Date    [id number] 12/11/2018     Lab Results   Component Value Date    TSH 0.995 12/11/2018     10-    OXC level 25 NL    11-    OXC level 3L    11-    The EEG is normal in the awake state.     1-6-2022     MRI brain showed no acute abnormality. Unremarkable appearance of the hippocampal structures. Sphenoid sinus disease. Remaining nonspecific findings as discussed above and possibly related to patient's cerebral palsy.    02- THROUGH 02-     AEEG 85 Hours (INTERPRETED 02-)     PGE, SSW, Generalized     Primary Generalized Epilepsy       Assessment:       1. Multiple neurological symptoms    2. Generalized epilepsy    3. Tension headache    4. Chronic migraine w/o aura w/o status migrainosus, not intractable    5. Bipolar disorder, in partial remission, most recent episode depressed    6. Spastic  quadriplegic cerebral palsy    7. Muscle spasms of both lower extremities    8. CVA, old, hemiparesis        EPILEPSY CLASSIFICATION        SEMIOLOGY: GTC     EPILEPTOGENIC ZONE (S): GENERALIZED     ETIOLOGY: UNKNOWN      PRIOR AEDS:  VPA (for headaches)    CURRENT AEDS: OXC (for bipolar), GBP (for headaches), LEV     LAST SEIZURE DATE: 1-4-2022        COMPREHENSIVE LIST OF AEDs:      Acetazolamide (AZM-Diamox)   Benzos: clonazepam (CZP Klonopin), lorazepam (LZP-Ativan), diazepam (DZP-Valium), clorazepate (CLZ- Tranxene)  Brivaracetam (BRV-Briviact)  Cannabidiol (CBD- Epidiolex)  Carbamazepine (CBZ-Tegretol)  Cenobamate (CNB-Xcopri)  Clobazam (CLB-Onfi)  Eslicarbazepine (ESL-Aptiom)  Ethosuximide (ESX-Zarontin)  Felbamate (FBM-Felbatol)  Gabapentin (GBP-Neurontin)  Lacosamide (LCM-Vimpat)  Lamotrigine (LTG-Lamitcal)  Levetiracetam (LEV- Keppra)  Oxcarbazepine (OXC-Trileptal)  Perampanel (PML-Fycompa)  Phenobarbital (PB)  Phenytoin (PHT-Dilantin)  Pregabalin (PGB-Lyrica)  Primidone (PRM)   Retigabine (RTG- Potiga) Discontinued in 2017  Rufinamide (RFN-Benzil)  Stiripentol (STP-Diacomit)  Tiagabine (TGB-Gabitril)  Topiramate (TPM-Topamax)  Valproate (VPA-Depakote)  Vigabatrin (VGB-Sabril)  Zonisamide (ZNS-Zonegran)      Plan:           SPASIC QUADRIPARETIC CP  WITH MUSCLE SPASMS AND TENSION HEADACHE    Continue Baclofen to 20 mg TID    Continue GBP to 250 mg TID    Increase Elavil from 75 mg to 100 mg QHS. Please notify me of adverse effects including increased drowsiness     Instructed patient to let caregivers know when she is having a headache so that we can accurately assess need for medication changes. It is unclear if patient is taking anything OTC for headaches. Instructed caregiver to keep headache diary to track headaches and use of OTC medications.      SEIZURE DISORDER    Continue  mg BID and check level annually as PRN    Continue  mg TID    Continue  mg BID    The patient was  encouraged to maintain full traditional seizure precautions which include but not limited to avoid high altitudes, avoid being close to fire or fire source, avoid being close to a body of water or swimming alone, avoid operating heavy machinery and avoid using sharp objects if possible. The patient was encouraged to shower (without accumulation of water) instead of taking a bath if unsupervised. The patient was made aware that these precautions are especially important during concurrent illness. Adequate sleep and avoidance of alcohol as important measures to assure good seizure control were discussed with the patient. The patient was also advised not to care for children without company. The patient was advised to pad the side rails with pillows and blankets if applicable.I strongly recommended lowering the bed to the floor level to decrease the risk of falls.       Continue AEDs INDEFINITELY, FOR GOOD AND NEVER SKIP A DOSE. The patient verbalized full understanding. Stressed the extreme medical, personal safety, public safety and legal importance of compliance and seizure control. Will give as many refills as possible with or without face-to-face evaluation to make sure the patient and any patient with epilepsy will never run out of medications. Running out of seizure medications should never happen under our care. I explained to the patient that he should not, under any circumstances, adjust or stop taking his seizure medication without discussing with me. The patient verbalized full understanding.       Explained to the patient that if 2 AEDs fail to control the seizures the likelihood of AEDs alone to control the seizures is <10% and other other options should be pursued.          AVOID any substance that could lower seizure threshold including but not limited to:      ALCOHOL AND WITHDRAWAL      TRAMADOL.     DEMEROL      ALL STIMULANTS-ALL ADHD MEDICATIONS.      CLOZAPINE.      BUPROPION.     CIPROFLOXACIN           SEIZURE FREEDOM DEFINITION: No seizures for 1 year or for 3 times the interval between the last 2 seizures whichever is longer (Some studies suggest 6 times even)!                 MEDICAL/SURGICAL COMORBIDITIES     All relevant medical comorbidities noted and managed by primary care physician and medical care team.        I spent a total of 30 minutes on the day of the visit.  This includes face to face time and non-face to face time preparing to see the patient (eg, review of tests), obtaining and/or reviewing separately obtained history, documenting clinical information in the electronic or other health record, independently interpreting results and communicating results to the patient/family/caregiver, or care coordinator.      RTC  8 weeks      Bess Barrow, MSN, NP    Collaborating Provider: Robert Liu MD, FAAN Neurologist/Epileptologist

## 2022-03-18 NOTE — PATIENT INSTRUCTIONS
Please notify me if any side effects with increase in amitriptyline dose, such as increased drowsiness. Please keep track of headaches with headache diary and bring to next appointment. Please let me know if over the counter medications such as ibuprofen or tylenol help with headaches.

## 2022-03-21 LAB — LEVETIRACETAM SERPL-MCNC: 21.6 UG/ML (ref 3–60)

## 2022-05-17 ENCOUNTER — TELEPHONE (OUTPATIENT)
Dept: NEUROLOGY | Facility: CLINIC | Age: 32
End: 2022-05-17
Payer: MEDICAID

## 2022-05-17 NOTE — TELEPHONE ENCOUNTER
----- Message from Mackenzie Perez sent at 5/17/2022  3:19 PM CDT -----  States she would like the nurse to give her a call to reschedule her appt. Please call Елена Ojeda [phone]. Thank you

## 2022-05-23 ENCOUNTER — TELEPHONE (OUTPATIENT)
Dept: NEUROLOGY | Facility: CLINIC | Age: 32
End: 2022-05-23
Payer: MEDICAID

## 2022-08-23 ENCOUNTER — TELEPHONE (OUTPATIENT)
Dept: NEUROLOGY | Facility: CLINIC | Age: 32
End: 2022-08-23
Payer: MEDICAID

## 2022-08-23 NOTE — TELEPHONE ENCOUNTER
----- Message from Stacy Carrasco sent at 8/23/2022  1:42 PM CDT -----  Sister (Елена) would like to speak with someone about missed appt.Please call back at [phone].Thanks    
Attempted to return patients phone call. RUPERTO left.-BR  
No

## 2022-09-06 ENCOUNTER — TELEPHONE (OUTPATIENT)
Dept: NEUROLOGY | Facility: CLINIC | Age: 32
End: 2022-09-06
Payer: MEDICAID

## 2022-09-09 ENCOUNTER — OFFICE VISIT (OUTPATIENT)
Dept: NEUROLOGY | Facility: CLINIC | Age: 32
End: 2022-09-09
Payer: MEDICAID

## 2022-09-09 VITALS
WEIGHT: 167.56 LBS | RESPIRATION RATE: 16 BRPM | DIASTOLIC BLOOD PRESSURE: 88 MMHG | BODY MASS INDEX: 28.6 KG/M2 | OXYGEN SATURATION: 99 % | SYSTOLIC BLOOD PRESSURE: 116 MMHG | HEIGHT: 64 IN | HEART RATE: 121 BPM

## 2022-09-09 DIAGNOSIS — F31.75 BIPOLAR DISORDER, IN PARTIAL REMISSION, MOST RECENT EPISODE DEPRESSED: ICD-10-CM

## 2022-09-09 DIAGNOSIS — M62.838 MUSCLE SPASMS OF BOTH LOWER EXTREMITIES: ICD-10-CM

## 2022-09-09 DIAGNOSIS — G80.0 SPASTIC QUADRIPLEGIC CEREBRAL PALSY: ICD-10-CM

## 2022-09-09 DIAGNOSIS — R47.9 SPEECH DIFFICULT TO UNDERSTAND: ICD-10-CM

## 2022-09-09 DIAGNOSIS — G40.909 SEIZURE DISORDER: ICD-10-CM

## 2022-09-09 DIAGNOSIS — I69.359 CVA, OLD, HEMIPARESIS: ICD-10-CM

## 2022-09-09 DIAGNOSIS — G47.00 INSOMNIA, UNSPECIFIED TYPE: ICD-10-CM

## 2022-09-09 DIAGNOSIS — G40.309 GENERALIZED EPILEPSY: ICD-10-CM

## 2022-09-09 DIAGNOSIS — R29.90 MULTIPLE NEUROLOGICAL SYMPTOMS: Primary | ICD-10-CM

## 2022-09-09 DIAGNOSIS — G44.209 TENSION HEADACHE: ICD-10-CM

## 2022-09-09 DIAGNOSIS — G43.709 CHRONIC MIGRAINE W/O AURA W/O STATUS MIGRAINOSUS, NOT INTRACTABLE: ICD-10-CM

## 2022-09-09 PROCEDURE — 1160F RVW MEDS BY RX/DR IN RCRD: CPT | Mod: CPTII,,, | Performed by: NURSE PRACTITIONER

## 2022-09-09 PROCEDURE — 1159F MED LIST DOCD IN RCRD: CPT | Mod: CPTII,,, | Performed by: NURSE PRACTITIONER

## 2022-09-09 PROCEDURE — 99213 PR OFFICE/OUTPT VISIT, EST, LEVL III, 20-29 MIN: ICD-10-PCS | Mod: S$PBB,,, | Performed by: NURSE PRACTITIONER

## 2022-09-09 PROCEDURE — 99999 PR PBB SHADOW E&M-EST. PATIENT-LVL V: CPT | Mod: PBBFAC,,, | Performed by: NURSE PRACTITIONER

## 2022-09-09 PROCEDURE — 99215 OFFICE O/P EST HI 40 MIN: CPT | Mod: PBBFAC | Performed by: NURSE PRACTITIONER

## 2022-09-09 PROCEDURE — 3008F PR BODY MASS INDEX (BMI) DOCUMENTED: ICD-10-PCS | Mod: CPTII,,, | Performed by: NURSE PRACTITIONER

## 2022-09-09 PROCEDURE — 99999 PR PBB SHADOW E&M-EST. PATIENT-LVL V: ICD-10-PCS | Mod: PBBFAC,,, | Performed by: NURSE PRACTITIONER

## 2022-09-09 PROCEDURE — 1159F PR MEDICATION LIST DOCUMENTED IN MEDICAL RECORD: ICD-10-PCS | Mod: CPTII,,, | Performed by: NURSE PRACTITIONER

## 2022-09-09 PROCEDURE — 3074F PR MOST RECENT SYSTOLIC BLOOD PRESSURE < 130 MM HG: ICD-10-PCS | Mod: CPTII,,, | Performed by: NURSE PRACTITIONER

## 2022-09-09 PROCEDURE — 3074F SYST BP LT 130 MM HG: CPT | Mod: CPTII,,, | Performed by: NURSE PRACTITIONER

## 2022-09-09 PROCEDURE — 1160F PR REVIEW ALL MEDS BY PRESCRIBER/CLIN PHARMACIST DOCUMENTED: ICD-10-PCS | Mod: CPTII,,, | Performed by: NURSE PRACTITIONER

## 2022-09-09 PROCEDURE — 3079F PR MOST RECENT DIASTOLIC BLOOD PRESSURE 80-89 MM HG: ICD-10-PCS | Mod: CPTII,,, | Performed by: NURSE PRACTITIONER

## 2022-09-09 PROCEDURE — 99213 OFFICE O/P EST LOW 20 MIN: CPT | Mod: S$PBB,,, | Performed by: NURSE PRACTITIONER

## 2022-09-09 PROCEDURE — 3079F DIAST BP 80-89 MM HG: CPT | Mod: CPTII,,, | Performed by: NURSE PRACTITIONER

## 2022-09-09 PROCEDURE — 3008F BODY MASS INDEX DOCD: CPT | Mod: CPTII,,, | Performed by: NURSE PRACTITIONER

## 2022-09-09 RX ORDER — LACTULOSE 10 G/15ML
SOLUTION ORAL; RECTAL
COMMUNITY
Start: 2022-08-10

## 2022-09-09 RX ORDER — SERTRALINE HCL 50 MG
TABLET ORAL
COMMUNITY
Start: 2022-08-23

## 2022-09-09 RX ORDER — METOPROLOL TARTRATE 100 MG/1
100 TABLET ORAL 2 TIMES DAILY
COMMUNITY
Start: 2022-08-10

## 2022-09-09 RX ORDER — MEDROXYPROGESTERONE ACETATE 150 MG/ML
INJECTION, SUSPENSION INTRAMUSCULAR
COMMUNITY
Start: 2022-08-02

## 2022-09-09 RX ORDER — HYDROCORTISONE 25 MG/G
CREAM TOPICAL
COMMUNITY
Start: 2022-05-25

## 2022-09-09 RX ORDER — ONDANSETRON 4 MG/1
4 TABLET, FILM COATED ORAL DAILY PRN
COMMUNITY
Start: 2022-08-10

## 2022-09-09 RX ORDER — LORAZEPAM 2 MG/1
2 TABLET ORAL NIGHTLY PRN
COMMUNITY
Start: 2022-08-23

## 2022-09-09 NOTE — PROGRESS NOTES
"Subjective:       Patient ID: Sandro Ojeda is a 32 y.o. female.    Chief Complaint: Follow-up    HPI     BACKGROUND    The patient with Spastic Quadriplegic CP who is here to establish care.  Her main problem right now is related to muscle spasms. She is on Baclofen 10/20 mg BID,  mg BID and Elavil 50 mg for QHS for headaches which are well-controlled. No seizures. She is on OXC for Bipolar Disorder. Family denies her having adverse effects from the medications. Patient states her headaches returned within the last week (1-2021). The headaches occur in the back of her head. She is currently taking elavil 75 mg QHS. Patient's caregiver states that she has not complained about a headache prior to this appointment. Patient has not taking anything OTC for headaches. Patient continues to complain of headaches stating amitriptyline 75 mg QHS and GBP are no longer helping. Caregiver states she is unaware of patient complaining of headaches at home. It is unclear how often patient is having headaches or if she is taking anything OTC for headaches.     Patient's sister states patient had a "seizure" that occurred in August 2021 while hospitalized at the [facility] for aspiration. The patient's sister states that the "seizure" was not witnessed by family and not described to them. It was called a "mild seizure" to them by hospital staff. She states all the patient's home medications (including OXC and GBP) were stopped at that time. Per [facility] records, patient had "seizure disorder with big breakthrough seizure". The "seizure" was not described but it was noted that the patient's home meds were stopped at the time, and she was treated with IV Keppra, which was discontinued after PEG tube placement and resumption of home meds. According to the patient's sister, the patient was also experiencing a lot of anxiety while in the hospital because no visitors were allowed due to COVID. Patient's mother states patient had a "seizure" " at age 6 on the bus on her way home from school. This episode was described by the  as her shaking and eyes rolling back as well as drooling. It lasted 5 to 10 minutes. This was her only episode prior to her hospitalization. She has been on Depakote in the past for migraines. Patient's father has a history of seizures. Patient has a history of CVA. No TBI. According to her caregiver, patient has had 2 episodes since her last visit in November 2021. Patient's events occurred in December 2021 and on 1-4-2022. The first event was witnessed by the caregiver. She described the event as the patient's arms stiffening and eyes rolling back. She made funny sounds during the event and was unaware.  She bit her tongue and had urinary incontinence. The event lasted about 2-3 minutes, and she had postictal confusion and drowsiness for about an hour. The patient went to the [facility] for the event that occurred on 1-4-2022. The caregiver did not witness this event and was unsure of details. She was told that her arms stiffened and eyes rolled back for this event. Denies changes in medications or recent antibiotic use. The patient was taking  mg TID and OXC at this time. 11- OXC level 3L. 11- The EEG is normal in the awake state. 1-6-2022 MRI brain showed no acute abnormality. Unremarkable appearance of the hippocampal structures. Sphenoid sinus disease. Remaining nonspecific findings as discussed above and possibly related to patient's cerebral palsy. Patient was started on  mg BID for seizures without adverse effects. She continues to  mg TID and OXC 5 mg BID. She is complaint with her medications. Caregiver denies patient having recent seizures stating last seizure was in 1-4-2022. 02- THROUGH 02- AEEG 85 Hours (INTERPRETED 02-) PGE, SSW, Generalized. Primary generalized epilepsy.         Interval History 9-9-2022: Patient presents to follow up appointment with  caregiver. Patient is currently taking  mg BID for seizures without adverse effects. She continues to  mg TID and OXC 5 mg BID. She is complaint with her medications. Caregiver denies patient having recent seizures stating last seizure was in 1-4-2022. 3- LEV level NL    No complaints of headaches. Taking amitriptyline 100 mg QHS,  mg TID, Baclofen 20 mg TID.             Review of Systems   Constitutional:  Negative for appetite change and fatigue.   HENT:  Negative for hearing loss and tinnitus.    Eyes:  Negative for photophobia and visual disturbance.   Respiratory:  Negative for apnea and shortness of breath.    Cardiovascular:  Negative for chest pain and palpitations.   Gastrointestinal:  Negative for nausea and vomiting.   Endocrine: Negative for cold intolerance and heat intolerance.   Genitourinary:  Negative for difficulty urinating and urgency.   Musculoskeletal:  Positive for arthralgias and gait problem. Negative for back pain, joint swelling, myalgias, neck pain and neck stiffness.   Skin:  Negative for color change and rash.   Allergic/Immunologic: Negative for environmental allergies and immunocompromised state.   Neurological:  Positive for speech difficulty and weakness. Negative for dizziness, tremors, seizures, syncope, facial asymmetry, light-headedness, numbness and headaches.   Hematological:  Negative for adenopathy. Does not bruise/bleed easily.   Psychiatric/Behavioral:  Positive for dysphoric mood and sleep disturbance. Negative for agitation, behavioral problems, confusion, decreased concentration, hallucinations, self-injury and suicidal ideas. The patient is nervous/anxious. The patient is not hyperactive.        Current Outpatient Medications:     amitriptyline (ELAVIL) 100 MG tablet, Take 1 tablet (100 mg total) by mouth every evening., Disp: 30 tablet, Rfl: 11    ARIPiprazole (ABILIFY) 400 mg sers syringe, Inject 400 mg into the muscle every 28 days.,  Disp: , Rfl:     baclofen (LIORESAL) 20 MG tablet, Take 1 tablet (20 mg total) by mouth 3 (three) times daily., Disp: 90 tablet, Rfl: 11    cetirizine (ZYRTEC) 10 MG tablet, Take 10 mg by mouth once daily., Disp: , Rfl:     gabapentin (NEURONTIN) 250 mg/5 mL solution, Take 5 mLs (250 mg total) by mouth 3 (three) times daily., Disp: 450 mL, Rfl: 11    hydrocortisone 2.5 % cream, Apply topically., Disp: , Rfl:     lactulose (CHRONULAC) 10 gram/15 mL solution, SMARTSIG:Milliliter(s) By Mouth, Disp: , Rfl:     levETIRAcetam (KEPPRA) 500 MG Tab, Take 1 tablet (500 mg total) by mouth 2 (two) times daily. Per PEG tube. Dissolve pill in water prior to use., Disp: 60 tablet, Rfl: 11    LORazepam (ATIVAN) 2 MG Tab, Take 2 mg by mouth nightly as needed., Disp: , Rfl:     magnesium oxide (MAG-OX) 400 mg (241.3 mg magnesium) tablet, Take 1 tablet (400 mg total) by mouth 2 (two) times daily., Disp: , Rfl: 0    medroxyPROGESTERone (DEPO-PROVERA) 150 mg/mL Syrg, Inject into the muscle., Disp: , Rfl:     metoprolol tartrate (LOPRESSOR) 100 MG tablet, Take 100 mg by mouth 2 (two) times daily., Disp: , Rfl:     metoprolol tartrate (LOPRESSOR) 25 MG tablet, Take 25 mg by mouth., Disp: , Rfl:     mometasone (NASONEX) 50 mcg/actuation nasal spray, 2 sprays by Nasal route., Disp: , Rfl:     ondansetron (ZOFRAN) 4 MG tablet, Take 4 mg by mouth daily as needed., Disp: , Rfl:     OXcarbazepine (TRILEPTAL) 300 mg/5 mL (60 mg/mL) Susp, Take 5 mg by mouth 2 (two) times daily., Disp: , Rfl:     pantoprazole (PROTONIX) 40 MG tablet, Take 40 mg by mouth once daily., Disp: , Rfl:     sertraline (ZOLOFT) 100 MG tablet, Take 100 mg by mouth once daily., Disp: , Rfl:     tamsulosin (FLOMAX) 0.4 mg Cp24, Take 0.4 mg by mouth once daily., Disp: , Rfl:     ZOLOFT 50 mg tablet, Take by mouth., Disp: , Rfl:   Past Medical History:   Diagnosis Date    Bipolar disorder 7/25/2018    GERD (gastroesophageal reflux disease)     Headache(784.0)     Spastic  quadriplegic cerebral palsy 7/25/2018    Tension headache 7/25/2018     Past Surgical History:   Procedure Laterality Date    BACK SURGERY      EYE SURGERY      HIP SURGERY       Social History     Socioeconomic History    Marital status: Single   Tobacco Use    Smoking status: Never    Smokeless tobacco: Never   Substance and Sexual Activity    Alcohol use: No    Drug use: No    Sexual activity: Never       Objective:     Vital signs reviewed     GENERAL APPEARANCE:     The patient looks comfortable.    No signs of medical or psychiatric distress.    Normal breathing pattern.    No dysmorphic features    Normal eye contact.     GENERAL MEDICAL EXAM:    HEENT:  Head is atraumatic normocephalic. No tender temporal arteries.     Neck and Axillae: No JVD. No carotid bruits. No thyromegaly. No lymphadenopathy.    Cardiopulmonary: No cyanosis. No tachypnea. Normal respiratory effort.    Gastrointestinal:  No stomas or lesions. No hernias. PEG tube.    Skin, Hair and Nails: No pathognonomic skin rash. No neurofibromatosis.   No stigmata of autoimmune disease.     Limbs: No varicose veins. No edema.    Muskoskeletal: Flexion deformities.No spine tenderness.   No signs of longstanding neuropathy. No dislocations or fractures.        Neurologic Exam     Mental Status   Oriented to person.   Oriented to place.   Disoriented to time. Disoriented to year, month and date.   Follows 2 step commands.   Attention: normal. Concentration: normal.   Speech: slurred   Level of consciousness: alert  Knowledge: good and consistent with education.   Able to name object. Able to repeat. Normal comprehension.     Spastic Dysarthria      Cranial Nerves     CN II   Visual acuity: decreased  Right visual field deficit: none  Left visual field deficit: none     CN III, IV, VI   Right pupil: Size: 4 mm. Shape: regular. Reactivity: brisk. Consensual response: intact. Accommodation: intact.   Left pupil: Size: 4 mm. Shape: regular. Reactivity:  brisk. Consensual response: intact. Accommodation: intact.   CN III: no CN III palsy  CN VI: right CN VI palsy  Nystagmus: none   Diplopia: right  Ophthalmoparesis: right abduction  Upgaze: normal  Downgaze: normal    CN V   Facial sensation intact.   Right facial sensation deficit: none  Left facial sensation deficit: none    CN VII   Right facial weakness: none  Left facial weakness: none    CN VIII   CN VIII normal.   Hearing: intact    CN IX, X   CN IX normal.   CN X normal.   Palate: symmetric    CN XI   CN XI normal.   Right sternocleidomastoid strength: weak  Left sternocleidomastoid strength: weak  Right trapezius strength: weak  Left trapezius strength: weak    CN XII   CN XII normal.   Tongue: not atrophic  Fasciculations: absent  Tongue deviation: none    Motor Exam   Muscle bulk: decreased  Overall muscle tone: increased  Right arm tone: spastic  Left arm tone: spastic  Right arm pronator drift: absent  Left arm pronator drift: absent  Right leg tone: spastic  Left leg tone: spastic    Strength   Right neck flexion: 4/5  Left neck flexion: 4/5  Right neck extension: 4/5  Left neck extension: 4/5  Right deltoid: 4/5  Left deltoid: 4/5  Right biceps: 4/5  Left biceps: 4/5  Right triceps: 3/5  Left triceps: 3/5  Right wrist flexion: 2/5  Left wrist flexion: 5/5  Right wrist extension: 2/5  Left wrist extension: 2/5  Right interossei: 1/5  Left interossei: 1/5  Right iliopsoas: 4/5  Left iliopsoas: 4/5  Right quadriceps: 3/5  Left quadriceps: 3/5  Right hamstring: 3/5  Left hamstring: 3/5  Right glutei: 3/5  Left glutei: 3/5  Right anterior tibial: 2/5  Left anterior tibial: 2/5  Right posterior tibial: 2/5  Left posterior tibial: 2/5  Right peroneal: 1/5  Left peroneal: 1/5  Right gastroc: 1/5  Left gastroc: 1/5    Sensory Exam   Light touch normal.   Right arm light touch: normal  Left arm light touch: normal  Right leg light touch: normal  Left leg light touch: normal  Right arm vibration: decreased from  fingers  Left arm vibration: decreased from fingers  Right leg vibration: decreased from toes  Left leg vibration: decreased from toes  Right arm proprioception: decreased from fingers  Left arm proprioception: decreased from fingers  Right leg proprioception: decreased from toes  Left leg proprioception: decreased from toes  Pinprick normal.   Right arm pinprick: normal  Left arm pinprick: normal  Right leg pinprick: normal  Left leg pinprick: normal    Gait, Coordination, and Reflexes     Gait  Gait: (WC-bound)    Coordination   Finger to nose coordination: abnormal  Heel to shin coordination: abnormal    Tremor   Resting tremor: absent  Intention tremor: absent  Action tremor: absent    Reflexes   Right brachioradialis: 3+  Left brachioradialis: 3+  Right biceps: 3+  Left biceps: 3+  Right triceps: 3+  Left triceps: 3+  Right patellar: 3+  Left patellar: 3+  Right achilles: 3+  Left achilles: 3+  Right plantar: upgoing  Left plantar: upgoing  Right Varela: present  Left Varela: present  Right ankle clonus: present  Left ankle clonus: present  Right pendular knee jerk: present  Left pendular knee jerk: present    Lab Results   Component Value Date    WBC 3.89 (L) 11/12/2021    HGB 14.4 11/12/2021    HCT 44.7 11/12/2021     (H) 11/12/2021     11/12/2021     Sodium   Date Value Ref Range Status   11/12/2021 138 136 - 145 mmol/L Final     Potassium   Date Value Ref Range Status   11/12/2021 4.6 3.5 - 5.1 mmol/L Final     Chloride   Date Value Ref Range Status   11/12/2021 102 95 - 110 mmol/L Final     CO2   Date Value Ref Range Status   11/12/2021 25 23 - 29 mmol/L Final     Glucose   Date Value Ref Range Status   11/12/2021 76 70 - 110 mg/dL Final     BUN   Date Value Ref Range Status   11/12/2021 7 6 - 20 mg/dL Final     Creatinine   Date Value Ref Range Status   11/12/2021 0.5 0.5 - 1.4 mg/dL Final     Calcium   Date Value Ref Range Status   11/12/2021 10.1 8.7 - 10.5 mg/dL Final     Total Protein    Date Value Ref Range Status   11/12/2021 7.8 6.0 - 8.4 g/dL Final     Albumin   Date Value Ref Range Status   11/12/2021 3.8 3.5 - 5.2 g/dL Final     Total Bilirubin   Date Value Ref Range Status   11/12/2021 0.4 0.1 - 1.0 mg/dL Final     Comment:     For infants and newborns, interpretation of results should be based  on gestational age, weight and in agreement with clinical  observations.    Premature Infant recommended reference ranges:  Up to 24 hours.............<8.0 mg/dL  Up to 48 hours............<12.0 mg/dL  3-5 days..................<15.0 mg/dL  6-29 days.................<15.0 mg/dL       Alkaline Phosphatase   Date Value Ref Range Status   11/12/2021 63 55 - 135 U/L Final     AST   Date Value Ref Range Status   11/12/2021 23 10 - 40 U/L Final     ALT   Date Value Ref Range Status   11/12/2021 32 10 - 44 U/L Final     Anion Gap   Date Value Ref Range Status   11/12/2021 11 8 - 16 mmol/L Final     eGFR if    Date Value Ref Range Status   11/12/2021 >60.0 >60 mL/min/1.73 m^2 Final     eGFR if non    Date Value Ref Range Status   11/12/2021 >60.0 >60 mL/min/1.73 m^2 Final     Comment:     Calculation used to obtain the estimated glomerular filtration  rate (eGFR) is the CKD-EPI equation.        Lab Results   Component Value Date    [id number] 434 12/11/2018     Lab Results   Component Value Date    TSH 0.995 12/11/2018     AED MONITORING    AED: Levetiracetam RANGE: 3-60 Dose    DATE LEVEL    3- 21.6 500 mg BID                                      10-    OXC level 25 NL    11-    OXC level 3L    11-    The EEG is normal in the awake state.     1-6-2022     MRI brain showed no acute abnormality. Unremarkable appearance of the hippocampal structures. Sphenoid sinus disease. Remaining nonspecific findings as discussed above and possibly related to patient's cerebral palsy.    02- THROUGH 02-     AEEG 85 Hours (INTERPRETED 02-)      PGE, SSW, Generalized     Primary Generalized Epilepsy       Assessment:       1. Multiple neurological symptoms    2. Speech difficult to understand    3. Generalized epilepsy    4. Tension headache    5. Chronic migraine w/o aura w/o status migrainosus, not intractable    6. Bipolar disorder, in partial remission, most recent episode depressed    7. Spastic quadriplegic cerebral palsy    8. Muscle spasms of both lower extremities    9. CVA, old, hemiparesis    10. Seizure disorder    11. Insomnia, unspecified type          EPILEPSY CLASSIFICATION        SEMIOLOGY: GTC     EPILEPTOGENIC ZONE (S): GENERALIZED     ETIOLOGY: UNKNOWN      PRIOR AEDS:  VPA (for headaches)    CURRENT AEDS: OXC (for bipolar), GBP (for headaches), LEV     LAST SEIZURE DATE: 1-4-2022        COMPREHENSIVE LIST OF AEDs:      Acetazolamide (AZM-Diamox)   Benzos: clonazepam (CZP Klonopin), lorazepam (LZP-Ativan), diazepam (DZP-Valium), clorazepate (CLZ- Tranxene)  Brivaracetam (BRV-Briviact)  Cannabidiol (CBD- Epidiolex)  Carbamazepine (CBZ-Tegretol)  Cenobamate (CNB-Xcopri)  Clobazam (CLB-Onfi)  Eslicarbazepine (ESL-Aptiom)  Ethosuximide (ESX-Zarontin)  Felbamate (FBM-Felbatol)  Gabapentin (GBP-Neurontin)  Lacosamide (LCM-Vimpat)  Lamotrigine (LTG-Lamitcal)  Levetiracetam (LEV- Keppra)  Oxcarbazepine (OXC-Trileptal)  Perampanel (PML-Fycompa)  Phenobarbital (PB)  Phenytoin (PHT-Dilantin)  Pregabalin (PGB-Lyrica)  Primidone (PRM)   Retigabine (RTG- Potiga) Discontinued in 2017  Rufinamide (RFN-Benzil)  Stiripentol (STP-Diacomit)  Tiagabine (TGB-Gabitril)  Topiramate (TPM-Topamax)  Valproate (VPA-Depakote)  Vigabatrin (VGB-Sabril)  Zonisamide (ZNS-Zonegran)      Plan:         SPASIC QUADRIPARETIC CP  WITH MUSCLE SPASMS AND TENSION HEADACHE    Continue Baclofen to 20 mg TID    Continue GBP to 250 mg TID    Continue Elavil 100 mg QHS.    Instructed patient to let caregivers know when she is having a headache so that we can accurately assess  need for medication changes. It is unclear if patient is taking anything OTC for headaches. Instructed caregiver to keep headache diary to track headaches and use of OTC medications.      GENERALIZED EPILEPSY     Continue  mg BID and check level annually as PRN    Continue  mg TID    Continue  mg BID    The patient was encouraged to maintain full traditional seizure precautions which include but not limited to avoid high altitudes, avoid being close to fire or fire source, avoid being close to a body of water or swimming alone, avoid operating heavy machinery and avoid using sharp objects if possible. The patient was encouraged to shower (without accumulation of water) instead of taking a bath if unsupervised. The patient was made aware that these precautions are especially important during concurrent illness. Adequate sleep and avoidance of alcohol as important measures to assure good seizure control were discussed with the patient. The patient was also advised not to care for children without company. The patient was advised to pad the side rails with pillows and blankets if applicable.I strongly recommended lowering the bed to the floor level to decrease the risk of falls.       Continue AEDs INDEFINITELY, FOR GOOD AND NEVER SKIP A DOSE. The patient verbalized full understanding. Stressed the extreme medical, personal safety, public safety and legal importance of compliance and seizure control. Will give as many refills as possible with or without face-to-face evaluation to make sure the patient and any patient with epilepsy will never run out of medications. Running out of seizure medications should never happen under our care. I explained to the patient that he should not, under any circumstances, adjust or stop taking his seizure medication without discussing with me. The patient verbalized full understanding.       Explained to the patient that if 2 AEDs fail to control the seizures the  likelihood of AEDs alone to control the seizures is <10% and other other options should be pursued.          AVOID any substance that could lower seizure threshold including but not limited to:      ALCOHOL AND WITHDRAWAL      TRAMADOL.     DEMEROL      ALL STIMULANTS-ALL ADHD MEDICATIONS.      CLOZAPINE.      BUPROPION.     CIPROFLOXACIN          SEIZURE FREEDOM DEFINITION: No seizures for 1 year or for 3 times the interval between the last 2 seizures whichever is longer (Some studies suggest 6 times even)!      SPEECH DIFFICULT TO UNDERSTAND    ST referral placed      INSOMNIA    Discuss with PCP             MEDICAL/SURGICAL COMORBIDITIES     All relevant medical comorbidities noted and managed by primary care physician and medical care team.        I spent a total of 24 minutes on the day of the visit.  This includes face to face time and non-face to face time preparing to see the patient (eg, review of tests), obtaining and/or reviewing separately obtained history, documenting clinical information in the electronic or other health record, independently interpreting results and communicating results to the patient/family/caregiver, or care coordinator.      RTC  6 months      Bess Barrow, MSN, NP    Collaborating Provider: Robert Liu MD, FAAN Neurologist/Epileptologist

## 2023-02-17 ENCOUNTER — TELEPHONE (OUTPATIENT)
Dept: NEUROLOGY | Facility: CLINIC | Age: 33
End: 2023-02-17
Payer: MEDICAID

## 2023-02-22 ENCOUNTER — TELEPHONE (OUTPATIENT)
Dept: NEUROLOGY | Facility: CLINIC | Age: 33
End: 2023-02-22
Payer: MEDICAID